# Patient Record
Sex: MALE | Race: WHITE | Employment: UNEMPLOYED | URBAN - METROPOLITAN AREA
[De-identification: names, ages, dates, MRNs, and addresses within clinical notes are randomized per-mention and may not be internally consistent; named-entity substitution may affect disease eponyms.]

---

## 2019-01-29 ENCOUNTER — HOSPITAL ENCOUNTER (INPATIENT)
Age: 39
LOS: 10 days | Discharge: HOME | DRG: 885 | End: 2019-02-08
Attending: PSYCHIATRY & NEUROLOGY | Admitting: PSYCHIATRY & NEUROLOGY
Payer: MEDICARE

## 2019-01-29 PROBLEM — F84.0 AUTISM SPECTRUM DISORDER: Chronic | Status: ACTIVE | Noted: 2019-01-29

## 2019-01-29 PROBLEM — F25.9 SCHIZOAFFECTIVE DISORDER (HCC): Status: ACTIVE | Noted: 2019-01-29

## 2019-01-29 PROCEDURE — 99223 1ST HOSP IP/OBS HIGH 75: CPT | Performed by: PSYCHIATRY & NEUROLOGY

## 2019-01-29 PROCEDURE — 6370000000 HC RX 637 (ALT 250 FOR IP): Performed by: PSYCHIATRY & NEUROLOGY

## 2019-01-29 PROCEDURE — 6370000000 HC RX 637 (ALT 250 FOR IP): Performed by: PHYSICIAN ASSISTANT

## 2019-01-29 PROCEDURE — 1240000000 HC EMOTIONAL WELLNESS R&B

## 2019-01-29 RX ORDER — BISACODYL 10 MG
10 SUPPOSITORY, RECTAL RECTAL DAILY PRN
Status: DISCONTINUED | OUTPATIENT
Start: 2019-01-29 | End: 2019-02-08 | Stop reason: HOSPADM

## 2019-01-29 RX ORDER — RISPERIDONE 2 MG/1
2 TABLET, FILM COATED ORAL 2 TIMES DAILY
Status: DISCONTINUED | OUTPATIENT
Start: 2019-01-29 | End: 2019-02-01

## 2019-01-29 RX ORDER — MAGNESIUM HYDROXIDE/ALUMINUM HYDROXICE/SIMETHICONE 120; 1200; 1200 MG/30ML; MG/30ML; MG/30ML
30 SUSPENSION ORAL EVERY 6 HOURS PRN
Status: DISCONTINUED | OUTPATIENT
Start: 2019-01-29 | End: 2019-02-08 | Stop reason: HOSPADM

## 2019-01-29 RX ORDER — OLANZAPINE 10 MG/1
10 INJECTION, POWDER, LYOPHILIZED, FOR SOLUTION INTRAMUSCULAR EVERY 8 HOURS PRN
Status: DISCONTINUED | OUTPATIENT
Start: 2019-01-29 | End: 2019-01-30

## 2019-01-29 RX ORDER — HALOPERIDOL 5 MG/ML
5 INJECTION INTRAMUSCULAR EVERY 6 HOURS PRN
Status: DISCONTINUED | OUTPATIENT
Start: 2019-01-29 | End: 2019-01-30

## 2019-01-29 RX ORDER — IBUPROFEN 400 MG/1
400 TABLET ORAL EVERY 6 HOURS PRN
Status: DISCONTINUED | OUTPATIENT
Start: 2019-01-29 | End: 2019-02-08 | Stop reason: HOSPADM

## 2019-01-29 RX ORDER — OLANZAPINE 5 MG/1
10 TABLET, ORALLY DISINTEGRATING ORAL EVERY 8 HOURS PRN
Status: DISCONTINUED | OUTPATIENT
Start: 2019-01-29 | End: 2019-01-30

## 2019-01-29 RX ORDER — ACETAMINOPHEN 325 MG/1
650 TABLET ORAL EVERY 4 HOURS PRN
Status: DISCONTINUED | OUTPATIENT
Start: 2019-01-29 | End: 2019-02-08 | Stop reason: HOSPADM

## 2019-01-29 RX ORDER — NICOTINE 21 MG/24HR
1 PATCH, TRANSDERMAL 24 HOURS TRANSDERMAL DAILY
Status: DISCONTINUED | OUTPATIENT
Start: 2019-01-29 | End: 2019-02-08 | Stop reason: HOSPADM

## 2019-01-29 RX ORDER — TRAZODONE HYDROCHLORIDE 50 MG/1
50 TABLET ORAL NIGHTLY PRN
Status: DISCONTINUED | OUTPATIENT
Start: 2019-01-29 | End: 2019-02-08 | Stop reason: HOSPADM

## 2019-01-29 RX ORDER — HYDROXYZINE PAMOATE 50 MG/1
50 CAPSULE ORAL 3 TIMES DAILY PRN
Status: DISCONTINUED | OUTPATIENT
Start: 2019-01-29 | End: 2019-02-08 | Stop reason: HOSPADM

## 2019-01-29 RX ADMIN — RISPERIDONE 2 MG: 2 TABLET ORAL at 08:54

## 2019-01-29 RX ADMIN — METFORMIN HYDROCHLORIDE 500 MG: 500 TABLET ORAL at 17:11

## 2019-01-29 RX ADMIN — RISPERIDONE 2 MG: 2 TABLET ORAL at 22:38

## 2019-01-29 ASSESSMENT — SLEEP AND FATIGUE QUESTIONNAIRES
AVERAGE NUMBER OF SLEEP HOURS: 0
DIFFICULTY FALLING ASLEEP: YES
DIFFICULTY STAYING ASLEEP: YES
SLEEP PATTERN: INSOMNIA
DIFFICULTY ARISING: YES
DO YOU USE A SLEEP AID: NO
RESTFUL SLEEP: NO
DIFFICULTY ARISING: YES
DO YOU USE A SLEEP AID: YES
DO YOU HAVE DIFFICULTY SLEEPING: YES
DIFFICULTY FALLING ASLEEP: YES
AVERAGE NUMBER OF SLEEP HOURS: 2
DO YOU HAVE DIFFICULTY SLEEPING: YES
AVERAGE NUMBER OF SLEEP HOURS: 4
SLEEP PATTERN: DIFFICULTY FALLING ASLEEP;DIFFICULTY ARISING;INSOMNIA
RESTFUL SLEEP: NO
DIFFICULTY STAYING ASLEEP: YES

## 2019-01-29 ASSESSMENT — PATIENT HEALTH QUESTIONNAIRE - PHQ9: SUM OF ALL RESPONSES TO PHQ QUESTIONS 1-9: 17

## 2019-01-29 ASSESSMENT — LIFESTYLE VARIABLES
HISTORY_ALCOHOL_USE: YES
HISTORY_ALCOHOL_USE: YES

## 2019-01-30 LAB
GLUCOSE BLD-MCNC: 111 MG/DL (ref 70–99)
PERFORMED ON: ABNORMAL

## 2019-01-30 PROCEDURE — 99222 1ST HOSP IP/OBS MODERATE 55: CPT | Performed by: PHYSICIAN ASSISTANT

## 2019-01-30 PROCEDURE — 1240000000 HC EMOTIONAL WELLNESS R&B

## 2019-01-30 PROCEDURE — 6370000000 HC RX 637 (ALT 250 FOR IP): Performed by: PSYCHIATRY & NEUROLOGY

## 2019-01-30 PROCEDURE — 99233 SBSQ HOSP IP/OBS HIGH 50: CPT | Performed by: PSYCHIATRY & NEUROLOGY

## 2019-01-30 PROCEDURE — 6370000000 HC RX 637 (ALT 250 FOR IP): Performed by: PHYSICIAN ASSISTANT

## 2019-01-30 RX ORDER — HALOPERIDOL 5 MG/ML
10 INJECTION INTRAMUSCULAR EVERY 6 HOURS PRN
Status: DISCONTINUED | OUTPATIENT
Start: 2019-01-30 | End: 2019-02-08 | Stop reason: HOSPADM

## 2019-01-30 RX ORDER — HALOPERIDOL 5 MG
10 TABLET ORAL EVERY 6 HOURS PRN
Status: DISCONTINUED | OUTPATIENT
Start: 2019-01-30 | End: 2019-02-08 | Stop reason: HOSPADM

## 2019-01-30 RX ADMIN — METFORMIN HYDROCHLORIDE 500 MG: 500 TABLET ORAL at 17:27

## 2019-01-30 RX ADMIN — RISPERIDONE 2 MG: 2 TABLET ORAL at 21:05

## 2019-01-30 RX ADMIN — METFORMIN HYDROCHLORIDE 500 MG: 500 TABLET ORAL at 09:02

## 2019-01-30 RX ADMIN — RISPERIDONE 2 MG: 2 TABLET ORAL at 09:02

## 2019-01-31 LAB
GLUCOSE BLD-MCNC: 120 MG/DL (ref 70–99)
PERFORMED ON: ABNORMAL

## 2019-01-31 PROCEDURE — 99233 SBSQ HOSP IP/OBS HIGH 50: CPT | Performed by: PSYCHIATRY & NEUROLOGY

## 2019-01-31 PROCEDURE — 6370000000 HC RX 637 (ALT 250 FOR IP): Performed by: PSYCHIATRY & NEUROLOGY

## 2019-01-31 PROCEDURE — 1240000000 HC EMOTIONAL WELLNESS R&B

## 2019-01-31 PROCEDURE — 6370000000 HC RX 637 (ALT 250 FOR IP): Performed by: PHYSICIAN ASSISTANT

## 2019-01-31 PROCEDURE — 6360000002 HC RX W HCPCS: Performed by: PSYCHIATRY & NEUROLOGY

## 2019-01-31 RX ADMIN — METFORMIN HYDROCHLORIDE 500 MG: 500 TABLET ORAL at 08:36

## 2019-01-31 RX ADMIN — RISPERIDONE 2 MG: 2 TABLET ORAL at 08:36

## 2019-01-31 RX ADMIN — HALOPERIDOL LACTATE 10 MG: 5 INJECTION, SOLUTION INTRAMUSCULAR at 15:02

## 2019-02-01 LAB
GLUCOSE BLD-MCNC: 100 MG/DL (ref 70–99)
PERFORMED ON: ABNORMAL

## 2019-02-01 PROCEDURE — 1240000000 HC EMOTIONAL WELLNESS R&B

## 2019-02-01 PROCEDURE — 6370000000 HC RX 637 (ALT 250 FOR IP): Performed by: PSYCHIATRY & NEUROLOGY

## 2019-02-01 PROCEDURE — 99233 SBSQ HOSP IP/OBS HIGH 50: CPT | Performed by: PSYCHIATRY & NEUROLOGY

## 2019-02-01 PROCEDURE — 6370000000 HC RX 637 (ALT 250 FOR IP): Performed by: PHYSICIAN ASSISTANT

## 2019-02-01 RX ADMIN — METFORMIN HYDROCHLORIDE 500 MG: 500 TABLET ORAL at 18:01

## 2019-02-01 RX ADMIN — RISPERIDONE 3 MG: 1 TABLET, FILM COATED ORAL at 19:54

## 2019-02-01 RX ADMIN — METFORMIN HYDROCHLORIDE 500 MG: 500 TABLET ORAL at 08:43

## 2019-02-01 RX ADMIN — RISPERIDONE 2 MG: 2 TABLET ORAL at 08:43

## 2019-02-01 RX ADMIN — HYDROXYZINE PAMOATE 50 MG: 50 CAPSULE ORAL at 21:38

## 2019-02-01 RX ADMIN — TRAZODONE HYDROCHLORIDE 50 MG: 50 TABLET ORAL at 21:38

## 2019-02-02 LAB
GLUCOSE BLD-MCNC: 157 MG/DL (ref 70–99)
PERFORMED ON: ABNORMAL

## 2019-02-02 PROCEDURE — 6370000000 HC RX 637 (ALT 250 FOR IP): Performed by: PHYSICIAN ASSISTANT

## 2019-02-02 PROCEDURE — 6370000000 HC RX 637 (ALT 250 FOR IP): Performed by: PSYCHIATRY & NEUROLOGY

## 2019-02-02 PROCEDURE — 99233 SBSQ HOSP IP/OBS HIGH 50: CPT | Performed by: PSYCHIATRY & NEUROLOGY

## 2019-02-02 PROCEDURE — 1240000000 HC EMOTIONAL WELLNESS R&B

## 2019-02-02 RX ORDER — RISPERIDONE 2 MG/1
4 TABLET, FILM COATED ORAL 2 TIMES DAILY
Status: DISCONTINUED | OUTPATIENT
Start: 2019-02-02 | End: 2019-02-08 | Stop reason: HOSPADM

## 2019-02-02 RX ADMIN — RISPERIDONE 3 MG: 1 TABLET, FILM COATED ORAL at 08:57

## 2019-02-02 RX ADMIN — RISPERIDONE 4 MG: 2 TABLET ORAL at 21:10

## 2019-02-02 RX ADMIN — METFORMIN HYDROCHLORIDE 500 MG: 500 TABLET ORAL at 16:41

## 2019-02-02 RX ADMIN — METFORMIN HYDROCHLORIDE 500 MG: 500 TABLET ORAL at 08:57

## 2019-02-03 LAB
GLUCOSE BLD-MCNC: 156 MG/DL (ref 70–99)
PERFORMED ON: ABNORMAL

## 2019-02-03 PROCEDURE — 1240000000 HC EMOTIONAL WELLNESS R&B

## 2019-02-03 PROCEDURE — 6370000000 HC RX 637 (ALT 250 FOR IP): Performed by: PSYCHIATRY & NEUROLOGY

## 2019-02-03 PROCEDURE — 6370000000 HC RX 637 (ALT 250 FOR IP): Performed by: PHYSICIAN ASSISTANT

## 2019-02-03 RX ADMIN — RISPERIDONE 4 MG: 2 TABLET ORAL at 08:08

## 2019-02-03 RX ADMIN — HALOPERIDOL 10 MG: 5 TABLET ORAL at 22:21

## 2019-02-03 RX ADMIN — METFORMIN HYDROCHLORIDE 500 MG: 500 TABLET ORAL at 08:08

## 2019-02-04 LAB
GLUCOSE BLD-MCNC: 174 MG/DL (ref 70–99)
PERFORMED ON: ABNORMAL

## 2019-02-04 PROCEDURE — 1240000000 HC EMOTIONAL WELLNESS R&B

## 2019-02-04 PROCEDURE — 99233 SBSQ HOSP IP/OBS HIGH 50: CPT | Performed by: PSYCHIATRY & NEUROLOGY

## 2019-02-04 PROCEDURE — 6370000000 HC RX 637 (ALT 250 FOR IP): Performed by: PSYCHIATRY & NEUROLOGY

## 2019-02-04 PROCEDURE — 6370000000 HC RX 637 (ALT 250 FOR IP): Performed by: PHYSICIAN ASSISTANT

## 2019-02-04 RX ADMIN — RISPERIDONE 4 MG: 2 TABLET ORAL at 21:01

## 2019-02-04 RX ADMIN — METFORMIN HYDROCHLORIDE 500 MG: 500 TABLET ORAL at 17:05

## 2019-02-04 RX ADMIN — RISPERIDONE 4 MG: 2 TABLET ORAL at 09:43

## 2019-02-04 RX ADMIN — METFORMIN HYDROCHLORIDE 500 MG: 500 TABLET ORAL at 09:42

## 2019-02-04 ASSESSMENT — ENCOUNTER SYMPTOMS
CONSTIPATION: 0
BACK PAIN: 0
DIARRHEA: 0
NAUSEA: 0
SHORTNESS OF BREATH: 0
SORE THROAT: 0
ABDOMINAL PAIN: 0
CHEST TIGHTNESS: 0

## 2019-02-05 LAB
GLUCOSE BLD-MCNC: 189 MG/DL (ref 70–99)
PERFORMED ON: ABNORMAL

## 2019-02-05 PROCEDURE — 1240000000 HC EMOTIONAL WELLNESS R&B

## 2019-02-05 PROCEDURE — 6370000000 HC RX 637 (ALT 250 FOR IP): Performed by: PHYSICIAN ASSISTANT

## 2019-02-05 PROCEDURE — 6370000000 HC RX 637 (ALT 250 FOR IP): Performed by: PSYCHIATRY & NEUROLOGY

## 2019-02-05 PROCEDURE — 99233 SBSQ HOSP IP/OBS HIGH 50: CPT | Performed by: PSYCHIATRY & NEUROLOGY

## 2019-02-05 RX ORDER — HALOPERIDOL 5 MG
5 TABLET ORAL DAILY
Status: DISCONTINUED | OUTPATIENT
Start: 2019-02-05 | End: 2019-02-07

## 2019-02-05 RX ADMIN — RISPERIDONE 4 MG: 2 TABLET ORAL at 08:33

## 2019-02-05 RX ADMIN — HALOPERIDOL 5 MG: 5 TABLET ORAL at 10:42

## 2019-02-05 RX ADMIN — METFORMIN HYDROCHLORIDE 500 MG: 500 TABLET ORAL at 17:07

## 2019-02-05 RX ADMIN — METFORMIN HYDROCHLORIDE 500 MG: 500 TABLET ORAL at 08:33

## 2019-02-05 RX ADMIN — RISPERIDONE 4 MG: 2 TABLET ORAL at 20:50

## 2019-02-06 LAB
GLUCOSE BLD-MCNC: 140 MG/DL (ref 70–99)
PERFORMED ON: ABNORMAL

## 2019-02-06 PROCEDURE — 6370000000 HC RX 637 (ALT 250 FOR IP): Performed by: PHYSICIAN ASSISTANT

## 2019-02-06 PROCEDURE — 6370000000 HC RX 637 (ALT 250 FOR IP): Performed by: PSYCHIATRY & NEUROLOGY

## 2019-02-06 PROCEDURE — 1240000000 HC EMOTIONAL WELLNESS R&B

## 2019-02-06 PROCEDURE — 99233 SBSQ HOSP IP/OBS HIGH 50: CPT | Performed by: PSYCHIATRY & NEUROLOGY

## 2019-02-06 RX ADMIN — RISPERIDONE 4 MG: 2 TABLET ORAL at 20:20

## 2019-02-06 RX ADMIN — RISPERIDONE 4 MG: 2 TABLET ORAL at 08:44

## 2019-02-06 RX ADMIN — METFORMIN HYDROCHLORIDE 500 MG: 500 TABLET ORAL at 08:44

## 2019-02-06 RX ADMIN — METFORMIN HYDROCHLORIDE 500 MG: 500 TABLET ORAL at 16:55

## 2019-02-06 RX ADMIN — HALOPERIDOL 5 MG: 5 TABLET ORAL at 08:44

## 2019-02-07 LAB
GLUCOSE BLD-MCNC: 164 MG/DL (ref 70–99)
PERFORMED ON: ABNORMAL

## 2019-02-07 PROCEDURE — 6370000000 HC RX 637 (ALT 250 FOR IP): Performed by: PHYSICIAN ASSISTANT

## 2019-02-07 PROCEDURE — 6370000000 HC RX 637 (ALT 250 FOR IP): Performed by: PSYCHIATRY & NEUROLOGY

## 2019-02-07 PROCEDURE — 99233 SBSQ HOSP IP/OBS HIGH 50: CPT | Performed by: PSYCHIATRY & NEUROLOGY

## 2019-02-07 PROCEDURE — 1240000000 HC EMOTIONAL WELLNESS R&B

## 2019-02-07 PROCEDURE — 6360000002 HC RX W HCPCS: Performed by: STUDENT IN AN ORGANIZED HEALTH CARE EDUCATION/TRAINING PROGRAM

## 2019-02-07 RX ADMIN — RISPERIDONE 4 MG: 2 TABLET ORAL at 20:36

## 2019-02-07 RX ADMIN — METFORMIN HYDROCHLORIDE 500 MG: 500 TABLET ORAL at 08:36

## 2019-02-07 RX ADMIN — RISPERIDONE 4 MG: 2 TABLET ORAL at 08:23

## 2019-02-07 RX ADMIN — RISPERIDONE 25 MG: KIT at 15:33

## 2019-02-07 RX ADMIN — METFORMIN HYDROCHLORIDE 500 MG: 500 TABLET ORAL at 16:51

## 2019-02-07 RX ADMIN — HALOPERIDOL 5 MG: 5 TABLET ORAL at 08:23

## 2019-02-08 VITALS
WEIGHT: 219 LBS | RESPIRATION RATE: 16 BRPM | HEART RATE: 101 BPM | DIASTOLIC BLOOD PRESSURE: 71 MMHG | HEIGHT: 68 IN | BODY MASS INDEX: 33.19 KG/M2 | SYSTOLIC BLOOD PRESSURE: 132 MMHG | TEMPERATURE: 97.7 F

## 2019-02-08 LAB
GLUCOSE BLD-MCNC: 188 MG/DL (ref 70–99)
PERFORMED ON: ABNORMAL

## 2019-02-08 PROCEDURE — 5130000000 HC BRIDGE APPOINTMENT

## 2019-02-08 PROCEDURE — 6370000000 HC RX 637 (ALT 250 FOR IP): Performed by: PSYCHIATRY & NEUROLOGY

## 2019-02-08 PROCEDURE — 99239 HOSP IP/OBS DSCHRG MGMT >30: CPT | Performed by: PSYCHIATRY & NEUROLOGY

## 2019-02-08 PROCEDURE — 6370000000 HC RX 637 (ALT 250 FOR IP): Performed by: PHYSICIAN ASSISTANT

## 2019-02-08 RX ORDER — RISPERIDONE 4 MG/1
4 TABLET, FILM COATED ORAL 2 TIMES DAILY
Qty: 60 TABLET | Refills: 0 | Status: ON HOLD | OUTPATIENT
Start: 2019-02-08 | End: 2019-05-08

## 2019-02-08 RX ADMIN — RISPERIDONE 4 MG: 2 TABLET ORAL at 08:40

## 2019-02-08 RX ADMIN — METFORMIN HYDROCHLORIDE 500 MG: 500 TABLET ORAL at 08:40

## 2019-05-08 ENCOUNTER — APPOINTMENT (OUTPATIENT)
Dept: CT IMAGING | Age: 39
End: 2019-05-08
Payer: MEDICARE

## 2019-05-08 ENCOUNTER — HOSPITAL ENCOUNTER (INPATIENT)
Age: 39
LOS: 20 days | Discharge: HOME | DRG: 885 | End: 2019-05-28
Attending: PSYCHIATRY & NEUROLOGY | Admitting: PSYCHIATRY & NEUROLOGY
Payer: MEDICARE

## 2019-05-08 ENCOUNTER — HOSPITAL ENCOUNTER (EMERGENCY)
Age: 39
Discharge: PSYCHIATRIC OR SUBSTANCE USE FACILITY | End: 2019-05-08
Attending: EMERGENCY MEDICINE
Payer: MEDICARE

## 2019-05-08 VITALS
HEART RATE: 61 BPM | SYSTOLIC BLOOD PRESSURE: 103 MMHG | WEIGHT: 215.39 LBS | BODY MASS INDEX: 32.75 KG/M2 | TEMPERATURE: 98.6 F | OXYGEN SATURATION: 92 % | DIASTOLIC BLOOD PRESSURE: 23 MMHG | RESPIRATION RATE: 10 BRPM

## 2019-05-08 DIAGNOSIS — F25.9 SCHIZOAFFECTIVE DISORDER, UNSPECIFIED TYPE (HCC): Primary | ICD-10-CM

## 2019-05-08 PROBLEM — F20.9 SCHIZOPHRENIA (HCC): Status: ACTIVE | Noted: 2019-05-08

## 2019-05-08 LAB
A/G RATIO: 1.3 (ref 1.1–2.2)
ACETAMINOPHEN LEVEL: <5 UG/ML (ref 10–30)
ALBUMIN SERPL-MCNC: 4.1 G/DL (ref 3.4–5)
ALP BLD-CCNC: 81 U/L (ref 40–129)
ALT SERPL-CCNC: 31 U/L (ref 10–40)
AMPHETAMINE SCREEN, URINE: NORMAL
ANION GAP SERPL CALCULATED.3IONS-SCNC: 9 MMOL/L (ref 3–16)
AST SERPL-CCNC: 18 U/L (ref 15–37)
BARBITURATE SCREEN URINE: NORMAL
BASOPHILS ABSOLUTE: 0 K/UL (ref 0–0.2)
BASOPHILS RELATIVE PERCENT: 0.4 %
BENZODIAZEPINE SCREEN, URINE: NORMAL
BILIRUB SERPL-MCNC: 0.3 MG/DL (ref 0–1)
BILIRUBIN URINE: ABNORMAL
BLOOD, URINE: NEGATIVE
BUN BLDV-MCNC: 13 MG/DL (ref 7–20)
CALCIUM SERPL-MCNC: 8.7 MG/DL (ref 8.3–10.6)
CANNABINOID SCREEN URINE: NORMAL
CHLORIDE BLD-SCNC: 108 MMOL/L (ref 99–110)
CLARITY: ABNORMAL
CO2: 25 MMOL/L (ref 21–32)
COCAINE METABOLITE SCREEN URINE: NORMAL
COLOR: ABNORMAL
CREAT SERPL-MCNC: 0.9 MG/DL (ref 0.9–1.3)
EOSINOPHILS ABSOLUTE: 0.2 K/UL (ref 0–0.6)
EOSINOPHILS RELATIVE PERCENT: 2.7 %
EPITHELIAL CELLS, UA: 1 /HPF (ref 0–5)
ETHANOL: NORMAL MG/DL (ref 0–0.08)
GFR AFRICAN AMERICAN: >60
GFR NON-AFRICAN AMERICAN: >60
GLOBULIN: 3.2 G/DL
GLUCOSE BLD-MCNC: 110 MG/DL (ref 70–99)
GLUCOSE BLD-MCNC: 119 MG/DL (ref 70–99)
GLUCOSE BLD-MCNC: 131 MG/DL (ref 70–99)
GLUCOSE URINE: NEGATIVE MG/DL
HCT VFR BLD CALC: 41.2 % (ref 40.5–52.5)
HEMOGLOBIN: 14.4 G/DL (ref 13.5–17.5)
HYALINE CASTS: 3 /LPF (ref 0–8)
KETONES, URINE: ABNORMAL MG/DL
LEUKOCYTE ESTERASE, URINE: NEGATIVE
LYMPHOCYTES ABSOLUTE: 1.4 K/UL (ref 1–5.1)
LYMPHOCYTES RELATIVE PERCENT: 18.9 %
Lab: NORMAL
MCH RBC QN AUTO: 31.1 PG (ref 26–34)
MCHC RBC AUTO-ENTMCNC: 35 G/DL (ref 31–36)
MCV RBC AUTO: 88.8 FL (ref 80–100)
METHADONE SCREEN, URINE: NORMAL
MICROSCOPIC EXAMINATION: YES
MONOCYTES ABSOLUTE: 0.6 K/UL (ref 0–1.3)
MONOCYTES RELATIVE PERCENT: 7.5 %
NEUTROPHILS ABSOLUTE: 5.2 K/UL (ref 1.7–7.7)
NEUTROPHILS RELATIVE PERCENT: 70.5 %
NITRITE, URINE: NEGATIVE
OPIATE SCREEN URINE: NORMAL
OXYCODONE URINE: NORMAL
PDW BLD-RTO: 14.1 % (ref 12.4–15.4)
PERFORMED ON: ABNORMAL
PERFORMED ON: ABNORMAL
PH UA: 5.5
PH UA: 5.5 (ref 5–8)
PHENCYCLIDINE SCREEN URINE: NORMAL
PLATELET # BLD: 251 K/UL (ref 135–450)
PMV BLD AUTO: 7.9 FL (ref 5–10.5)
POTASSIUM SERPL-SCNC: 3.6 MMOL/L (ref 3.5–5.1)
PROPOXYPHENE SCREEN: NORMAL
PROTEIN UA: ABNORMAL MG/DL
RBC # BLD: 4.64 M/UL (ref 4.2–5.9)
RBC UA: 4 /HPF (ref 0–4)
SALICYLATE, SERUM: <0.3 MG/DL (ref 15–30)
SODIUM BLD-SCNC: 142 MMOL/L (ref 136–145)
SPECIFIC GRAVITY UA: >1.03 (ref 1–1.03)
TOTAL PROTEIN: 7.3 G/DL (ref 6.4–8.2)
URINE REFLEX TO CULTURE: ABNORMAL
URINE TYPE: ABNORMAL
UROBILINOGEN, URINE: 0.2 E.U./DL
WBC # BLD: 7.3 K/UL (ref 4–11)
WBC UA: 2 /HPF (ref 0–5)

## 2019-05-08 PROCEDURE — 80307 DRUG TEST PRSMV CHEM ANLYZR: CPT

## 2019-05-08 PROCEDURE — 6370000000 HC RX 637 (ALT 250 FOR IP): Performed by: PSYCHIATRY & NEUROLOGY

## 2019-05-08 PROCEDURE — 81001 URINALYSIS AUTO W/SCOPE: CPT

## 2019-05-08 PROCEDURE — G0480 DRUG TEST DEF 1-7 CLASSES: HCPCS

## 2019-05-08 PROCEDURE — 96372 THER/PROPH/DIAG INJ SC/IM: CPT

## 2019-05-08 PROCEDURE — 6360000002 HC RX W HCPCS: Performed by: EMERGENCY MEDICINE

## 2019-05-08 PROCEDURE — 85025 COMPLETE CBC W/AUTO DIFF WBC: CPT

## 2019-05-08 PROCEDURE — 70450 CT HEAD/BRAIN W/O DYE: CPT

## 2019-05-08 PROCEDURE — 1240000000 HC EMOTIONAL WELLNESS R&B

## 2019-05-08 PROCEDURE — 2580000003 HC RX 258: Performed by: EMERGENCY MEDICINE

## 2019-05-08 PROCEDURE — 80053 COMPREHEN METABOLIC PANEL: CPT

## 2019-05-08 PROCEDURE — 99285 EMERGENCY DEPT VISIT HI MDM: CPT

## 2019-05-08 RX ORDER — TRAZODONE HYDROCHLORIDE 50 MG/1
50 TABLET ORAL NIGHTLY PRN
Status: DISCONTINUED | OUTPATIENT
Start: 2019-05-08 | End: 2019-05-28 | Stop reason: HOSPADM

## 2019-05-08 RX ORDER — 0.9 % SODIUM CHLORIDE 0.9 %
500 INTRAVENOUS SOLUTION INTRAVENOUS ONCE
Status: COMPLETED | OUTPATIENT
Start: 2019-05-08 | End: 2019-05-08

## 2019-05-08 RX ORDER — HYDROXYZINE PAMOATE 50 MG/1
50 CAPSULE ORAL 3 TIMES DAILY PRN
Status: DISCONTINUED | OUTPATIENT
Start: 2019-05-08 | End: 2019-05-28 | Stop reason: HOSPADM

## 2019-05-08 RX ORDER — DIPHENHYDRAMINE HYDROCHLORIDE 50 MG/ML
12.5 INJECTION INTRAMUSCULAR; INTRAVENOUS ONCE
Status: COMPLETED | OUTPATIENT
Start: 2019-05-08 | End: 2019-05-08

## 2019-05-08 RX ORDER — LORAZEPAM 2 MG/ML
1 INJECTION INTRAMUSCULAR ONCE
Status: COMPLETED | OUTPATIENT
Start: 2019-05-08 | End: 2019-05-08

## 2019-05-08 RX ORDER — MAGNESIUM HYDROXIDE/ALUMINUM HYDROXICE/SIMETHICONE 120; 1200; 1200 MG/30ML; MG/30ML; MG/30ML
30 SUSPENSION ORAL EVERY 6 HOURS PRN
Status: DISCONTINUED | OUTPATIENT
Start: 2019-05-08 | End: 2019-05-28 | Stop reason: HOSPADM

## 2019-05-08 RX ORDER — RISPERIDONE 2 MG/1
1 TABLET, FILM COATED ORAL NIGHTLY
Status: ON HOLD | COMMUNITY
Start: 2019-03-18 | End: 2019-05-28 | Stop reason: HOSPADM

## 2019-05-08 RX ORDER — RISPERIDONE 2 MG/1
2 TABLET, FILM COATED ORAL NIGHTLY
Status: DISCONTINUED | OUTPATIENT
Start: 2019-05-08 | End: 2019-05-09

## 2019-05-08 RX ORDER — HALOPERIDOL 5 MG/ML
5 INJECTION INTRAMUSCULAR EVERY 6 HOURS PRN
Status: DISCONTINUED | OUTPATIENT
Start: 2019-05-08 | End: 2019-05-09

## 2019-05-08 RX ORDER — NICOTINE POLACRILEX 4 MG
15 LOZENGE BUCCAL PRN
Status: DISCONTINUED | OUTPATIENT
Start: 2019-05-08 | End: 2019-05-28 | Stop reason: HOSPADM

## 2019-05-08 RX ORDER — DEXTROSE MONOHYDRATE 25 G/50ML
12.5 INJECTION, SOLUTION INTRAVENOUS PRN
Status: DISCONTINUED | OUTPATIENT
Start: 2019-05-08 | End: 2019-05-28 | Stop reason: HOSPADM

## 2019-05-08 RX ORDER — DEXTROSE MONOHYDRATE 50 MG/ML
100 INJECTION, SOLUTION INTRAVENOUS PRN
Status: DISCONTINUED | OUTPATIENT
Start: 2019-05-08 | End: 2019-05-28 | Stop reason: HOSPADM

## 2019-05-08 RX ORDER — TRAZODONE HYDROCHLORIDE 50 MG/1
50 TABLET ORAL NIGHTLY PRN
Status: DISCONTINUED | OUTPATIENT
Start: 2019-05-09 | End: 2019-05-08

## 2019-05-08 RX ORDER — ACETAMINOPHEN 325 MG/1
650 TABLET ORAL EVERY 4 HOURS PRN
Status: DISCONTINUED | OUTPATIENT
Start: 2019-05-08 | End: 2019-05-28 | Stop reason: HOSPADM

## 2019-05-08 RX ORDER — IBUPROFEN 400 MG/1
400 TABLET ORAL EVERY 6 HOURS PRN
Status: DISCONTINUED | OUTPATIENT
Start: 2019-05-08 | End: 2019-05-28 | Stop reason: HOSPADM

## 2019-05-08 RX ADMIN — SODIUM CHLORIDE 500 ML: 9 INJECTION, SOLUTION INTRAVENOUS at 09:47

## 2019-05-08 RX ADMIN — LORAZEPAM 1 MG: 2 INJECTION INTRAMUSCULAR; INTRAVENOUS at 14:06

## 2019-05-08 RX ADMIN — DIPHENHYDRAMINE HYDROCHLORIDE 12.5 MG: 50 INJECTION, SOLUTION INTRAMUSCULAR; INTRAVENOUS at 14:05

## 2019-05-08 RX ADMIN — RISPERIDONE 2 MG: 2 TABLET ORAL at 21:35

## 2019-05-08 ASSESSMENT — LIFESTYLE VARIABLES: HISTORY_ALCOHOL_USE: NO

## 2019-05-08 ASSESSMENT — SLEEP AND FATIGUE QUESTIONNAIRES
AVERAGE NUMBER OF SLEEP HOURS: 5
DO YOU USE A SLEEP AID: NO
DO YOU HAVE DIFFICULTY SLEEPING: NO

## 2019-05-08 NOTE — ED PROVIDER NOTES
Triage Chief Complaint:   Altered Mental Status (per squad pt states he takes medication that turns him into a mythical creature. pt not talking on arrival although walked in from squad pt has hx of mental disorder )    ADARSH Baugh is a 44 y.o. male that presents for evaluation of altered mental status. The patient has past medical history schizophrenia, hypertension, diabetes. He was found wandering around his house stating that he was locked out. Per report he also told EMS that he turns into [place] mythical creature\". He arrives able to state that his name is Xochitl Weaver but otherwise will not participate in ROS/HPI and continues to stare blankly. ROS:  At least 12 systems reviewed and otherwise acutely negative except as in the [address]     Past Medical History:   Diagnosis Date    Hypertension     Schizophrenia, schizo-affective ([facility] [address]      Past Surgical History:   Procedure Laterality Date    NOSE SURGERY      TONSILLECTOMY       Family History   Problem Relation Age of Onset    Heart Disease Paternal Aunt     Cancer Paternal Aunt     Heart Disease Paternal Grandfather     Cancer Paternal Grandfather     Substance Abuse Mother      Social History     Socioeconomic History    Marital status: Single     Spouse name: Not on file    Number of children: 1    Years of education: Not on file    Highest education level: Not on file   Occupational History    Not on file   Social Needs    Financial resource strain: Not on file    Food insecurity:     Worry: Not on file     Inability: Not on file    Transportation needs:     Medical: Not on file     Non-medical: Not on file   Tobacco Use    Smoking status: Current Every Day Smoker     Packs/day: 2.00     Years: 23.00     Pack years: 46.00     Types: Cigarettes    Smokeless tobacco: Never Used   Substance and Sexual Activity    Alcohol use: Yes     Comment: 3x wk    Drug use: Not on file    Sexual activity: Yes     Partners: Female   Lifestyle    Physical activity:     Days per week: Not on file     Minutes per session: Not on file    Stress: Not on file   Relationships    Social connections:     Talks on phone: Not on file     Gets together: Not on file     Attends Confucianism service: Not on file     Active member of club or organization: Not on file     Attends meetings of clubs or organizations: Not on file     Relationship status: Not on file    Intimate partner violence:     Fear of current or ex partner: Not on file     Emotionally abused: Not on file     Physically abused: Not on file     Forced sexual activity: Not on file   Other Topics Concern    Not on file   Social History Narrative    Not on file     Current Facility-Administered Medications   Medication Dose Route Frequency Provider Last Rate Last Dose    0.9 % sodium chloride bolus  500 mL Intravenous Once Lora Michelle MD 1,000 mL/hr at 05/08/19 0947 500 mL at 05/08/19 0947     Current Outpatient Medications   Medication Sig Dispense Refill    metFORMIN (GLUCOPHAGE) 500 MG tablet Take 1 tablet by mouth 2 times daily (with meals) 60 tablet 0    risperiDONE (RISPERDAL) 4 MG tablet Take 1 tablet by mouth 2 times daily 60 tablet 0    risperiDONE microspheres (RISPERDAL CONSTA) 25 MG injection Inject 2 mLs into the muscle every 14 days 2 vial 4     Allergies   Allergen Reactions    Lisinopril        [unfilled]    Nursing Notes Reviewed    Physical Exam:  Vitals:    05/08/19 0910   BP: (!) 138/92   Pulse: 80   Resp: 22   Temp: 98.6 °F (37 °C)   SpO2: 94%       GENERAL APPEARANCE: Awake and alert. Cooperative. No acute distress. HEAD: Normocephalic. Atraumatic. EYES: EOM's grossly intact. Sclera anicteric. ENT: Mucous membranes are moist. Tolerates saliva. No trismus. NECK: Supple. No meningismus. Trachea midline. HEART: RRR. Radial pulses 2+. LUNGS: Respirations unlabored. CTAB  ABDOMEN: Soft. Non-tender. No guarding or rebound. EXTREMITIES: No acute deformities.   SKIN: Warm and dry. NEUROLOGICAL: No gross facial drooping. Moves all 4 extremities spontaneously. PSYCHIATRIC: The patient has a very bizarre affect. He may be responding to internal stimuli as he is not able to successfully hold a conversation and continues to stare straight off into space. He denies suicidal or homicidal ideation but he will not answer questions regarding hallucinations. I have reviewed and interpreted all of the currently available lab results from this visit (if applicable):  Results for orders placed or performed during the hospital encounter of 05/08/19   CBC Auto Differential   Result Value Ref Range    WBC 7.3 4.0 - 11.0 K/uL    RBC 4.64 4.20 - 5.90 M/uL    Hemoglobin 14.4 13.5 - 17.5 g/dL    Hematocrit 41.2 40.5 - 52.5 %    MCV 88.8 80.0 - 100.0 fL    MCH 31.1 26.0 - 34.0 pg    MCHC 35.0 31.0 - 36.0 g/dL    RDW 14.1 12.4 - 15.4 %    Platelets 926 407 - 945 K/uL    MPV 7.9 5.0 - 10.5 fL    Neutrophils % 70.5 %    Lymphocytes % 18.9 %    Monocytes % 7.5 %    Eosinophils % 2.7 %    Basophils % 0.4 %    Neutrophils # 5.2 1.7 - 7.7 K/uL    Lymphocytes # 1.4 1.0 - 5.1 K/uL    Monocytes # 0.6 0.0 - 1.3 K/uL    Eosinophils # 0.2 0.0 - 0.6 K/uL    Basophils # 0.0 0.0 - 0.2 K/uL   POCT Glucose   Result Value Ref Range    POC Glucose 119 (H) 70 - 99 mg/dl    Performed on ACCU-CHEK         Radiographs (if obtained):  [] The following radiograph was interpreted by myself in the absence of a radiologist:  [x] Radiologist's Report Reviewed:     CT HEAD WO CONTRAST (Final result)   Result time 05/08/19 09:43:49   Final result by Angel Gonzalez MD (05/08/19 09:43:49)                Impression:    No acute intracranial abnormality.             Narrative:    EXAMINATION:  CT OF THE HEAD WITHOUT CONTRAST  5/8/2019 9:18 am    TECHNIQUE:  CT of the head was performed without the administration of intravenous  contrast. Dose modulation, iterative reconstruction, and/or weight based  adjustment of the mA/kV was utilized to reduce the radiation dose to as low  as reasonably achievable. COMPARISON:  None. HISTORY:  ORDERING SYSTEM PROVIDED HISTORY: AMS  TECHNOLOGIST PROVIDED HISTORY:  Has a \"code stroke\" or \"stroke alert\" been called? ->No  Ordering Physician Provided Reason for Exam: ams  Acuity: Acute  Type of Exam: Initial    FINDINGS:  BRAIN/VENTRICLES: There is no acute intracranial hemorrhage, mass effect or  midline shift.  No abnormal extra-axial fluid collection.  The gray-white  differentiation is maintained without evidence of an acute infarct.  There is  no evidence of hydrocephalus. ORBITS: The visualized portion of the orbits demonstrate no acute abnormality. SINUSES: The visualized paranasal sinuses and mastoid air cells demonstrate  no acute abnormality. SOFT TISSUES/SKULL:  No acute abnormality of the visualized skull or soft  tissues.                      EKG (if obtained): (All EKG's are interpreted by myself in the absence of a cardiologist)  Initial EKG on my interpretation shows n/a    MDM:  Differential diagnosis: Psychiatric issue, intoxication, but glucose abn    CT head wnl  CBC/CMP/Etoh/ASA/Tylenol/UDS/UA no acute abn    At this time with his lab workup and CT of the head unremarkable have consult psychiatry as I'm concerned that his altered mental status is of a psychiatric etiology. The patient is cleared from a medical perspective. Upon re-eval at 12:20pm I asked if he recalls meeting me earlier today and he states \"yes, you are Will Pacheco\". This is not correct and I strongly suspect he is having hallucinations and may be responding to internal stimuli. I will sign a pysch hold. The patient was accepted by Dr. Daquan Reyes to [place]. Of note at approximately 2 PM the patient started to walk around the ED and is becoming increasingly more prone to not following direction.   He was resistant to sitting back in bed so after we coaxed him into sitting back in bed IM 1 mg of Ativan and 12.5 mg Benadryl given. Old records reviewed. Labs and imaging reviewed and results discussed with patient. Patient was given scripts for the following medications. I counseled patient how to take these medications. New Prescriptions    No medications on file         CRITICAL CARE TIME   Total Critical Care time was 0 minutes, excluding separately reportable procedures. There was a high probability of clinically significant/life threatening deterioration in the patient's condition which required my urgent intervention.       Clinical Impression:  AMS, psych issue  (Please note that portions of this note may have been completed with a voice recognition program. Efforts were made to edit the dictations but occasionally words are mis-transcribed.)    MD Edenilson Devine MD  05/08/19 8030

## 2019-05-08 NOTE — ED NOTES
Pt sitting up in bed per md pt allowed to have food and drink pt alert and states he was supposed to go to behavioral clinic today to get ongliza injection. Pt with call light in reach and will keep close supervision pt on monitor.       Boston Chapin RN  05/08/19 0855

## 2019-05-08 NOTE — ED NOTES
Pt report called to nursing staff at [facility] at this time pt report given to first care.       Brooke Galo, IRA  05/08/19 6595

## 2019-05-08 NOTE — ED NOTES
Pt speaking and answering yes and not questions pt asked to provide urine or to have straight cath. Pt stated \" I cant go\"  I told pt only needed a small amount of urine. Pt states \"your going to have to cath me\"  Pt agreeable to have straight cath. Pt straight cathed at this time. Pt yelled out \"motherfucker\" pt tolerated procedure. Pt urine sent to lab at this time.        Vince Radford RN  05/08/19 0079

## 2019-05-08 NOTE — ED NOTES
Pt came walking down the hallway, staring straight ahead, when asked if there was something he needed, pt stated \" we need to save human kind\" pt then preceded to walk down the hallway backwards and turned backwards into his room, pt then came back out again and preceded to the ambulance doors and stated \" I need a location service\"  Pt then was escorted back to his room, ED MD is at bedside, RN charge nurse is at bedside as well      Дмитрий Gan RN  05/08/19 0974

## 2019-05-09 PROCEDURE — 1240000000 HC EMOTIONAL WELLNESS R&B

## 2019-05-09 PROCEDURE — 6370000000 HC RX 637 (ALT 250 FOR IP): Performed by: PSYCHIATRY & NEUROLOGY

## 2019-05-09 PROCEDURE — 99223 1ST HOSP IP/OBS HIGH 75: CPT | Performed by: PSYCHIATRY & NEUROLOGY

## 2019-05-09 PROCEDURE — 6360000002 HC RX W HCPCS: Performed by: PSYCHIATRY & NEUROLOGY

## 2019-05-09 RX ORDER — LORAZEPAM 2 MG/1
2 TABLET ORAL EVERY 6 HOURS PRN
Status: DISCONTINUED | OUTPATIENT
Start: 2019-05-09 | End: 2019-05-28 | Stop reason: HOSPADM

## 2019-05-09 RX ORDER — LORAZEPAM 2 MG/ML
2 INJECTION INTRAMUSCULAR EVERY 6 HOURS PRN
Status: DISCONTINUED | OUTPATIENT
Start: 2019-05-09 | End: 2019-05-28 | Stop reason: HOSPADM

## 2019-05-09 RX ORDER — LORAZEPAM 2 MG/ML
2 INJECTION INTRAMUSCULAR ONCE
Status: COMPLETED | OUTPATIENT
Start: 2019-05-09 | End: 2019-05-09

## 2019-05-09 RX ORDER — HALOPERIDOL 5 MG/ML
10 INJECTION INTRAMUSCULAR EVERY 6 HOURS PRN
Status: DISCONTINUED | OUTPATIENT
Start: 2019-05-09 | End: 2019-05-28 | Stop reason: HOSPADM

## 2019-05-09 RX ORDER — DIPHENHYDRAMINE HYDROCHLORIDE 50 MG/ML
50 INJECTION INTRAMUSCULAR; INTRAVENOUS ONCE
Status: COMPLETED | OUTPATIENT
Start: 2019-05-09 | End: 2019-05-09

## 2019-05-09 RX ORDER — HALOPERIDOL 5 MG/ML
5 INJECTION INTRAMUSCULAR ONCE
Status: COMPLETED | OUTPATIENT
Start: 2019-05-09 | End: 2019-05-09

## 2019-05-09 RX ORDER — DIPHENHYDRAMINE HYDROCHLORIDE 50 MG/ML
50 INJECTION INTRAMUSCULAR; INTRAVENOUS EVERY 6 HOURS PRN
Status: DISCONTINUED | OUTPATIENT
Start: 2019-05-09 | End: 2019-05-28 | Stop reason: HOSPADM

## 2019-05-09 RX ORDER — RISPERIDONE 2 MG/1
2 TABLET, FILM COATED ORAL 2 TIMES DAILY
Status: DISCONTINUED | OUTPATIENT
Start: 2019-05-09 | End: 2019-05-20

## 2019-05-09 RX ORDER — DIPHENHYDRAMINE HCL 25 MG
50 TABLET ORAL EVERY 6 HOURS PRN
Status: DISCONTINUED | OUTPATIENT
Start: 2019-05-09 | End: 2019-05-28 | Stop reason: HOSPADM

## 2019-05-09 RX ORDER — HALOPERIDOL 10 MG/1
10 TABLET ORAL EVERY 6 HOURS PRN
Status: DISCONTINUED | OUTPATIENT
Start: 2019-05-09 | End: 2019-05-28 | Stop reason: HOSPADM

## 2019-05-09 RX ADMIN — LORAZEPAM 2 MG: 2 INJECTION INTRAMUSCULAR; INTRAVENOUS at 11:27

## 2019-05-09 RX ADMIN — HALOPERIDOL LACTATE 5 MG: 5 INJECTION INTRAMUSCULAR at 11:27

## 2019-05-09 RX ADMIN — RISPERIDONE 2 MG: 2 TABLET ORAL at 21:34

## 2019-05-09 RX ADMIN — DIPHENHYDRAMINE HYDROCHLORIDE 50 MG: 50 INJECTION, SOLUTION INTRAMUSCULAR; INTRAVENOUS at 11:27

## 2019-05-09 RX ADMIN — HALOPERIDOL LACTATE 5 MG: 5 INJECTION, SOLUTION INTRAMUSCULAR at 09:55

## 2019-05-09 NOTE — BH NOTE
Therapist tried to complete psycho social assessment on Pt. Pt came into [place] [address] with therapist.  Pt was carrying three new testaments which he used to trace the wall and outlets. Pt took the pillow case off a pillow and started wiping the table stating I am getting rid of bugs, there were no bugs. Pt then laid on the floor and put is legs up in the air. Pt then got up and started touching the wall and then climbed into a chair from the back of the chair. Pt stood up turned up the volume of the radio and started pushing buttons on the thermostat. Pt started turning the lights on and off. Pt tried to get out the door but could not open the door. When therapist asked Pt questions he would answer with a number.

## 2019-05-09 NOTE — PROGRESS NOTES
Occupational Therapy      Attempted OT eval.  Hold t this time per nsg to let patient rest.  Plan to re-attempt as time permits.     Deja Weller, OTR/L  #[phone]

## 2019-05-09 NOTE — BH NOTE
`[facility] [place]  Admission Note     Admission Type:   Admission Type: Involuntary    Reason for admission:  Reason for Admission: Delusions,  A/V/H    PATIENT STRENGTHS:  Strengths: No significant Physical Illness    Patient Strengths and Limitations:  Limitations: External locus of control    Addictive Behavior:   Addictive Behavior  In the past 3 months, have you felt or has someone told you that you have a problem with:  : None  Do you have a history of Chemical Use?: No  Do you have a history of Alcohol Use?: No  Do you have a history of Street Drug Abuse?: No  Histroy of Prescripton Drug Abuse?: No    Medical Problems:   Past Medical History:   Diagnosis Date    Hypertension     Schizophrenia, schizo-affective (HCC)        Status EXAM:  Status and Exam  Normal: No  Facial Expression: Exaggerated, Elevated  Affect: Incongruent, Unstable  Level of Consciousness: Alert  Mood:Normal: No  Mood: Labile, Suspicious  Motor Activity:Normal: No  Motor Activity: Repetitive Acts  Interview Behavior: Evasive(delusional)  Preception: [place] to Person  Attention:Normal: No  Attention: Distractible  Thought Processes: Blocking, Tangential  Thought Content:Normal: No  Thought Content: Paranoia, Delusions  Hallucinations:  Auditory (Comment)(Denies but RTIS)  Delusions: Yes  Delusions: Persecution  Memory:Normal: No  Memory: Poor Recent, Poor Remote  Insight and Judgment: No  Insight and Judgment: Poor Judgment, Poor Insight  Present Suicidal Ideation: No  Present Homicidal Ideation: No    Tobacco Screening:  Practical Counseling, on admission, hernandez X, if applicable and completed (first 3 are required if patient doesn't refuse):            ( )  Recognizing danger situations (included triggers and roadblocks)                    ( )  Coping skills (new ways to manage stress, exercise, relaxation techniques, changing routine, distraction)                                                           (x )  Basic information about quitting (benefits of quitting, techniques in how to quit, available resources  ( ) Referral for counseling faxed to [facility]                          ( x) Patient refused counseling  ( ) Patient has not smoked in the last 30 days    Metabolic Screening:    Lab Results   Component Value Date    LABA1C 5.5 03/14/2011       Lab Results   Component Value Date    CHOL 102 03/14/2011     Lab Results   Component Value Date    TRIG 113 03/14/2011     Lab Results   Component Value Date    HDL 21 (L) 03/14/2011     No components found for: [facility]RE[facility] [place]  Lab Results   Component Value Date    LABVLDL 23 03/14/2011         Body mass index is 32.99 kg/m². BP Readings from Last 2 Encounters:   05/08/19 135/74   05/08/19 (!) 103/23           Pt admitted with followings belongings:  Dentures: None  Vision - Corrective Lenses: None  Hearing Aid: None  Jewelry: None  Body Piercings Removed: N/A  Clothing: Socks, Pants, Shirt  Were All Patient Medications Collected?: Not Applicable  Other Valuables: Cell phone     Valuables placed in locker. Valuables placed in safe in security envelope, number:  none. Patient's home medications were N/A. Patient oriented to surroundings and program expectations and copy of patient rights given. Received admission packet:  yes. Consents reviewed, signed no. Refused no. Patient verbalize understanding:  WENDI, Patient not in right state of mind.   Patient education on precautions: yes                   Anu Wynn RN

## 2019-05-09 NOTE — BH NOTE
[facility]  Initial Interdisciplinary Treatment Plan NOTE    Review Date & Time: 5/9/19 6079    Patient was not in treatment team    Admission Type:   Admission Type: Involuntary    Reason for admission:  Reason for Admission: Delusions,  A/V/H      Estimated Length of Stay Update:  3 to 5 days  Estimated Discharge Date Update: 5/12/19 to 5/15/19    PATIENT STRENGTHS:  Patient Strengths Strengths: (WENDI)  Patient Strengths and Limitations:Limitations: Unrealistic self-view, Multiple barriers to leisure interests, Difficulty problem solving/relies on others to help solve problems  Addictive Behavior:Addictive Behavior  In the past 3 months, have you felt or has someone told you that you have a problem with:  : (WENDI)  Do you have a history of Chemical Use?: (WENDI)  Do you have a history of Alcohol Use?: (WENDI)  Do you have a history of Street Drug Abuse?: (WENDI)  Histroy of Prescripton Drug Abuse?: (WENDI)  Medical Problems:  Past Medical History:   Diagnosis Date    Hypertension     Schizophrenia, schizo-affective ([facility] [address]        EDUCATION:   Learner Progress Toward Treatment Goals: Reviewed goals and plan of care    Method: Individual    Outcome: No evidence of Learning    PATIENT GOALS: Patient unable to make goal right now. PLAN/TREATMENT RECOMMENDATIONS UPDATE:Evaluate or treatment and medication management.     GOALS UPDATE:   Time frame for Short-Term Goals: 2 days    Latoya Schneider RN

## 2019-05-09 NOTE — BH NOTE
Patient refusing fingerstick. Patient BS today was 131 at [facility] er. Patient only on oral medication for diabetes. Patient displaying no s/s of hypoglycemia or hyperglycemia. Will continue to monitor.

## 2019-05-09 NOTE — GROUP NOTE
Group Therapy Note    Date: May 9    Group Start Time: 1000  Group End Time: 1050  Group Topic: Psychoeducation    [facility] OP EJ Thakur        Group Therapy Note    Attendees: 13    Group Topic: Patients were directed and encouraged to engage in a group discussion focusing on communication styles, barriers to communicating assertively and effective problem solving for dealing with difficulty conversations and interactions with others. Notes:  Krishna Ward arrived to group late but was unable to engage in group discussion or appropriate group interactions. Krishna Ward required redirection to control his intrusive behaviors when witnessed leaning on other pts who were sitting participating in group. Krishna Ward was easily redirectable but appeared severely disorganized and without reality orientation.      Status After Intervention:  Unchanged    Participation Level: None    Participation Quality: Intrusive      Speech:  No verbal interactions with others      Thought Process/Content: disorganized, RTIS,       Affective Functioning: Flat      Mood: WENDI      Level of consciousness:  Alert, Preoccupied and Inattentive      Response to Learning: unable to comprehend group topic or discussion       Endings: None Reported    Modes of Intervention: Education, Exploration, Clarifying and Problem-solving      Discipline Responsible: Psychoeducational Specialist      Signature:  Tata Vicente, MS, ATR-BC

## 2019-05-10 PROCEDURE — 6360000002 HC RX W HCPCS: Performed by: PSYCHIATRY & NEUROLOGY

## 2019-05-10 PROCEDURE — 1240000000 HC EMOTIONAL WELLNESS R&B

## 2019-05-10 PROCEDURE — 6370000000 HC RX 637 (ALT 250 FOR IP): Performed by: PSYCHIATRY & NEUROLOGY

## 2019-05-10 PROCEDURE — 99233 SBSQ HOSP IP/OBS HIGH 50: CPT | Performed by: PSYCHIATRY & NEUROLOGY

## 2019-05-10 RX ADMIN — LORAZEPAM 2 MG: 2 INJECTION INTRAMUSCULAR; INTRAVENOUS at 13:29

## 2019-05-10 RX ADMIN — RISPERIDONE 2 MG: 2 TABLET ORAL at 20:10

## 2019-05-10 RX ADMIN — DIPHENHYDRAMINE HYDROCHLORIDE 50 MG: 50 INJECTION, SOLUTION INTRAMUSCULAR; INTRAVENOUS at 13:29

## 2019-05-10 RX ADMIN — HALOPERIDOL LACTATE 10 MG: 5 INJECTION INTRAMUSCULAR at 13:29

## 2019-05-10 NOTE — BH NOTE
Pt calm, interacting appropriately with staff. No s/s of behavioral disturbances, anxiety, or agitation. Medications effective.

## 2019-05-10 NOTE — H&P
[address]                 [address] [place] [address]                              HISTORY AND PHYSICAL    PATIENT NAME: Jamie Pettit                       :        1980  MED REC NO:   [id number]                          ROOM:       2304  ACCOUNT NO:   [de-identified]                           ADMIT DATE: 2019  PROVIDER:     Homero Boas. Norma Tan MD    DATE OF EVALUATION:  2019    CHIEF COMPLAINT:  Psychosis. HISTORY OF PRESENT ILLNESS:  The patient is a 22-year-old male who  presented to [facility] on 2019 with altered mental status. He  was brought in by [facility] and he was acting very bizarrely. He was  psychotic. He stated that his medication turns him into a \"mythical  creature. \"  When he came into the ED, apparently, he was not talking. Today, when I met with him, he was mumbling, but was not able to  communicate with me, and he has a history of schizophrenia and autism. He was found wandering around his house stated he was lost out. Per  EMS, he was able to state his name, but otherwise did not participate in  the history and continued to stare blankly according to the report. When he was brought into the unit, he spit out his medication on to the  floor. Reports from staff that he appeared to be responding to internal  stimuli. He appeared distracted and disorganized. He was pacing into  other patient's rooms, generally redirectable. He appeared to  disheveled. He had milk on the floor. Apparently, he was getting into  the shower with clothes on. He was also making comments that he needed  to save mankind. When the assessment was trying to be completed by the   department, he was carrying 3 testaments, which he used  to trace the wall and outlets. He took a pillow case off the pillow and  started wiping the table, stating that he was getting rid of bugs, but  there were no bugs.   He then lay on the Reviewed. Alcohol none detected. Urine drug screen  was negative. Glucose is 119 in the ED. RADIOLOGY DATA:  CT of the head, 05/08/2019. No acute abnormality. PHYSICAL EXAMINATION:  Vital signs:  Temperature 97.7, pulse 65,  respirations 15, and blood pressure 133/81. REVIEW OF SYSTEMS:  Pertinent positives on the HPI, otherwise negative. MENTAL STATUS EXAMINATION:  The patient is a disheveled 51-year-old  white male. He made minimal eye contact. He would stare blankly at  times. He spoke with primarily grunting noises. He was trying to move  his bed into the hallway, when I met with him. He had water all over  his shirt. Apparently, he was in the shower with his clothes on. He  did not answers questions regarding self-harm or harm toward others. No  psychosis. He appeared to be highly disorganized. Did not speak. Insight and judgment severely impaired. Unable to assess for  orientation, fund of knowledge and language, attention span, memory. Affect was flattened. He did not answers questions regarding mood. Walked without any abnormalities. DIAGNOSES:  AXIS I:  1. Schizophrenia, disorganized type. 2.  Autistic spectrum disorder. AXIS II:  Deferred. AXIS III:  Diabetes. AXIS IV:  Severe. AXIS V:  25. PLAN:  1. We will continue with Risperdal 2 mg twice a day. 2.  Glucophage 500 mg twice a day. 3.  The patient has already received Haldol, Benadryl, and Ativan p.r.n.  injections today. 4.  We will need to understand when he last had his Risperdal Consta. 5.  The patient is an appropriate candidate for an injectable  long-acting medication. 6.  Discharge home once stabilized. 7.  Estimated length of stay 5 to 7 days.         Keily Child MD    D: 05/09/2019 18:49:49       T: 05/09/2019 21:26:26     [email or web address]  Job#: [id number]     Doc#: [id number]    CC:

## 2019-05-10 NOTE — PROGRESS NOTES
Department of Psychiatry  Attending Progress Note  Chief Complaint: psychosis  Erik Dahl remains psychotic but was able to communicate more effectively. He still is taking about \"mythical creatures. \" He feels that his apartment has \"human beings\" in it. He thinks that he is a mythical creature \"neanderthal.\" He would grin at times and was on  His bed. He is improved from yesterday although it is relative. To get scheduled Invega Sustenna 234 mg IM today   Patient's chart was reviewed and collaborated with  about the treatment plan. SUBJECTIVE:    Patient is feeling better. Suicidal ideation:  denies suicidal ideation. Patient does not have medication side effects. ROS: Patient has new complaints: no  Sleeping adequately:  Yes   Appetite adequate: No:   Attending groups: No:   Visitors:No    OBJECTIVE    Physical  VITALS:  /61   Pulse 56   Temp 96.2 °F (35.7 °C) (Axillary)   Resp 16   Wt 217 lb (98.4 kg)   BMI 32.99 kg/m²     Mental Status Examination:  Patients appearance was ill-appearing. Thoughts are Illogical. Homicidal ideations none. No abnormal movements, tics or mannerisms. Memory intact Aims 0. Concentration Fair. Alert and oriented X 4. Insight and Judgement delusions. Patient was cooperative.  Patient gait normal. Mood constricted, affect flat affect Hallucinations present, suicidal ideations no specific plan to harm self Speech increased latency of response and soft  Data  Labs:   Admission on 05/08/2019   Component Date Value Ref Range Status    POC Glucose 05/08/2019 110* 70 - 99 mg/dl Final    Performed on 05/08/2019 ACCU-CHEK   Final            Medications  Current Facility-Administered Medications: paliperidone palmitate ER (INVEGA SUSTENNA) IM injection 234 mg, 234 mg, Intramuscular, Once  risperiDONE (RISPERDAL) tablet 2 mg, 2 mg, Oral, BID  haloperidol lactate (HALDOL) injection 10 mg, 10 mg, Intramuscular, Q6H PRN  haloperidol (HALDOL) tablet 10 mg, 10 mg, Oral, Q6H PRN  LORazepam (ATIVAN) tablet 2 mg, 2 mg, Oral, Q6H PRN **OR** LORazepam (ATIVAN) injection 2 mg, 2 mg, Intramuscular, Q6H PRN  diphenhydrAMINE (BENADRYL) injection 50 mg, 50 mg, Intramuscular, Q6H PRN **OR** diphenhydrAMINE (BENADRYL) tablet 50 mg, 50 mg, Oral, Q6H PRN  metFORMIN (GLUCOPHAGE) tablet 500 mg, 500 mg, Oral, BID WC  acetaminophen (TYLENOL) tablet 650 mg, 650 mg, Oral, Q4H PRN  ibuprofen (ADVIL;MOTRIN) tablet 400 mg, 400 mg, Oral, Q6H PRN  hydrOXYzine (VISTARIL) capsule 50 mg, 50 mg, Oral, TID PRN  magnesium hydroxide (MILK OF MAGNESIA) 400 MG/5ML suspension 30 mL, 30 mL, Oral, Daily PRN  aluminum & magnesium hydroxide-simethicone (MAALOX) 200-200-20 MG/5ML suspension 30 mL, 30 mL, Oral, Q6H PRN  glucose (GLUTOSE) 40 % oral gel 15 g, 15 g, Oral, PRN  dextrose 50 % solution 12.5 g, 12.5 g, Intravenous, PRN  glucagon (rDNA) injection 1 mg, 1 mg, Intramuscular, PRN  dextrose 5 % solution, 100 mL/hr, Intravenous, PRN  traZODone (DESYREL) tablet 50 mg, 50 mg, Oral, Nightly PRN    ASSESSMENT AND PLAN    Principal Problem:    [facility] ([facility]  Active Problems:    Autism spectrum disorder    Type II diabetes mellitus, well controlled ([facility]  Resolved Problems:    * No resolved hospital problems. *       1. Patient s symptoms   are improving. Invega Sustenna 234 mg today . Continue with Risperdal 2 mg BID but will taper. 2.Probable discharge is next week  3. Discharge planning is incomplete  4. Suicidal ideation is better  5. Total time with patient was 40 minutes and more than 50 % of that time was spent counseling the patient on their symptoms, treatment and expected goals.

## 2019-05-10 NOTE — PLAN OF CARE
Problem: Altered Mood, Psychotic Behavior:  Goal: Absence of self-harm  Description  Absence of self-harm  5/9/2019 2225 by Jeffry Cook RN  Outcome: Ongoing  5/9/2019 0905 by Grant Barone RN  Outcome: Ongoing     Problem: Altered Mood, Psychotic Behavior:  Goal: Compliance with prescribed medication regimen will improve  Description  Compliance with prescribed medication regimen will improve  5/9/2019 2225 by Jeffry Cook RN  Outcome: Ongoing  5/9/2019 0905 by Grant Barone RN  Outcome: Ongoing     Problem: Confusion - Acute:  Goal: Absence of continued neurological deterioration signs and symptoms  Description  Absence of continued neurological deterioration signs and symptoms  5/9/2019 0905 by Grant Barone RN  Outcome: Ongoing   Pt was seen at the  desk. He accepted his HS medications from another RN without any issues. He denied any SI,HI. He was was noted to be confused to time, place and situation. He retreated to his bed shortly after this RN's arrival to unit.

## 2019-05-10 NOTE — BH NOTE
Pt offered morning scheduled medications. Pt state \"I don't need no fucking medicine dude. \" Pt returned to his room ad closed the door. Will monitor.

## 2019-05-10 NOTE — BH NOTE
Writer spoke with pt's new CM, Freddy Lezama [phone], with [facility] High [facility] team. He stated that he is available on the weekend as well if any contact is needed. He will be meeting with pt 5 days per week upon discharge.     Matt Mcdonough MSW, LSW

## 2019-05-10 NOTE — BH NOTE
Group Therapy Note      Date: 5/10/2019  Start Time: 1600  End Time:  1700  Number of Participants: 6    A [facility] volunteer came in and led group.        Signature:  Reynaldo Rider RN

## 2019-05-10 NOTE — BH NOTE
Pt exited room wet head to toe with clothing and pillow warapped around his left foot. Pt slowly laid down on floor. Pt was not moving, and staring blankly at ceiling. When staff approached pt mumbled unintelligible words. While attempting to get vitals for pt pt would not keep arm still that had cuff on it. Unable to obtain vitals. Pt able to stand-up and walk. Pt refused to answer assessments questions. Pt returned to room. Pt then returned to day area with only his shirt and underwear on. Pt laid down on the floor again. Pt offered medication for his agitation and anxiety. Pt agreeable to 10 mg Haldol IM, 2 mg Ativan IM, and 50 mg Benadryl IM, see eMAR. While administering medication pt attempted to grope a female staff member. Pt verbally redirected, and female staff member left area. Pt moved to room without a hospital bed as pt had been moving his bed around his room and hitting it against the wall. Pt continues to pace the small area spinning in circles against the wall, attempting to rearrange furniture. Pt is redirectable at times with much encouragement, though pt reverts back to behaviors quickly. Will monitor.

## 2019-05-11 PROCEDURE — 6370000000 HC RX 637 (ALT 250 FOR IP): Performed by: PSYCHIATRY & NEUROLOGY

## 2019-05-11 PROCEDURE — 99233 SBSQ HOSP IP/OBS HIGH 50: CPT | Performed by: NURSE PRACTITIONER

## 2019-05-11 PROCEDURE — 1240000000 HC EMOTIONAL WELLNESS R&B

## 2019-05-11 RX ADMIN — METFORMIN HYDROCHLORIDE 500 MG: 500 TABLET ORAL at 08:49

## 2019-05-11 RX ADMIN — RISPERIDONE 2 MG: 2 TABLET ORAL at 20:05

## 2019-05-11 RX ADMIN — RISPERIDONE 2 MG: 2 TABLET ORAL at 08:49

## 2019-05-11 RX ADMIN — METFORMIN HYDROCHLORIDE 500 MG: 500 TABLET ORAL at 17:01

## 2019-05-11 NOTE — PLAN OF CARE
Patient denies SI/HI/AVH at this time. Pt has been regressed to room and in bed this shift, except out for meals and brief periods of time. Pt compliant with meds. Pt alert and oriented x3, re-oriented to situation. Pt thinks he is at the hospital because \"I need to get my eyes examined. Will continue to monitor for safety.    Problem: Altered Mood, Psychotic Behavior:  Goal: Able to verbalize reality based thinking  Description  Able to verbalize reality based thinking  5/11/2019 1450 by Matthew Burton RN  Outcome: Ongoing

## 2019-05-11 NOTE — PLAN OF CARE
Patient was out with patient group, briefly earlier in the shift. Patient had came out once after being in the shower. Patient was directed back to his room & given a safety gown & paper pants. The water was turned off in patient's room, for the evening. Patient thought he was at the [facility]. After being oriented by writer, patient stated,\" I would of being charged with arson, if I didn't come here. \" Patient, went on to say that he came her for the rest of his family. \"I was getting sick. \" Patient appeared preoccupied, but denied having hallucinations.  Bridger Hennessy R.N.

## 2019-05-11 NOTE — BH NOTE
Per charge nurse pt was currently not group appropriate to attend reactional 1:30 group.       Noel Yi MSW,LSW

## 2019-05-11 NOTE — PROGRESS NOTES
mg Oral BID    metFORMIN  500 mg Oral BID        PRN Meds  haloperidol lactate, haloperidol, LORazepam **OR** LORazepam, diphenhydrAMINE **OR** diphenhydrAMINE, acetaminophen, ibuprofen, hydrOXYzine, magnesium hydroxide, aluminum & magnesium hydroxide-simethicone, glucose, dextrose, glucagon (rDNA), dextrose, traZODone    OBJECTIVE  Labs:  No results found for this or any previous visit (from the past 24 hour(s)). Physical Assessment:  Constitutional: No acute distress noted  HEENT: Atraumatic  Cardiovascular: VSS  Respiratory: no distress noted  Neurological: No cranial nerve abnormalities apparent  MS: No abnormalities apparent  Gait: WNL  Psychiatric: Please see below    Vital Signs:  Vitals:    05/08/19 2245 05/09/19 0755 05/09/19 2010 05/10/19 1953   BP:  133/81 117/61 121/87   Pulse:  65 56 85   Resp:  16 16 16   Temp:  97.7 °F (36.5 °C) 96.2 °F (35.7 °C) 97.1 °F (36.2 °C)   TempSrc:  Oral Axillary Axillary   Weight: 217 lb (98.4 kg)            PSYCHIATRIC ASSESSMENT   Sleep: [] 0-3  [x] 4-6 [] 7-9   [] 10+  Appetite adequate:  [x] Yes  [] No  Attending to hygiene:[x] Yes  [] No   EPS: [] Yes  [x] No  Attending groups: [] Yes  [x] No  Patient reports side effects from psychiatric medications: [] Yes [x] No   Patient on more than 1 Antipsychotic: [] Yes  [x] No    PSYCHIATRIC EXAMINATION / MENTAL STATUS EXAM:     Appearance/Hygiene:  well-appearing and hospital attire   Motor Behavior: WNL   Gait: WNL  Attitude: cooperative  Affect: labile affect   Speech: normal pitch and normal volume  Mood: mild lability   Thought Processes: disorganized  Perceptions: appears to be RTIS   Thought content: paranoid   Suicidal ideation:  no specific plan to harm self   Homicidal ideation:  none  Cognition: Symptoms are currently causing impairment   Orientation: A&Ox2  Insight: none  Judgement: poor          Diagnoses  Schizophrenia      Plan   Reviewed nursing and ancillary staff notes from last 24 hours.   Evaluated medications and assessed for side effects and effectiveness. Assessed patient's educational needs including reviewing Plan of Care, medications and diagnosis. Requires Inpatient Hospitalization as Least Restrictive Environment: YES    1. Plan: Risperdal 2 mg BID  2. Legal: Involuntary hold signed  3.  Discharge:   · Collaborate with SW to gather collateral and determine appropriate community support and disposition         Total time: 35 minutes spent with patient completing evaluation process and more than 50% of the time was spent completing evaluation and planning treatment with the patient    Dr. Michael Forde, DNP, APRN, PMHNP  05/11/19

## 2019-05-12 LAB
GLUCOSE BLD-MCNC: 122 MG/DL (ref 70–99)
PERFORMED ON: ABNORMAL

## 2019-05-12 PROCEDURE — 6370000000 HC RX 637 (ALT 250 FOR IP): Performed by: PSYCHIATRY & NEUROLOGY

## 2019-05-12 PROCEDURE — 1240000000 HC EMOTIONAL WELLNESS R&B

## 2019-05-12 RX ADMIN — RISPERIDONE 2 MG: 2 TABLET ORAL at 20:08

## 2019-05-12 RX ADMIN — TRAZODONE HYDROCHLORIDE 50 MG: 50 TABLET ORAL at 00:20

## 2019-05-12 RX ADMIN — METFORMIN HYDROCHLORIDE 500 MG: 500 TABLET ORAL at 08:37

## 2019-05-12 RX ADMIN — RISPERIDONE 2 MG: 2 TABLET ORAL at 08:37

## 2019-05-12 RX ADMIN — METFORMIN HYDROCHLORIDE 500 MG: 500 TABLET ORAL at 16:50

## 2019-05-12 NOTE — BH NOTE
Per charge nurse, patient is not currently appropriate to attend 1:15 cognitive skills group.      Mario Marino MSW,LSW

## 2019-05-12 NOTE — BH NOTE
Pt can be heard on the phone wishing people happy mother's day. Pt is so far appropriate on the phone and turned it back in without issue. Will continue to monitor.

## 2019-05-12 NOTE — PLAN OF CARE
Patient rates Depression 2/10 and Anxiety 8/10. Patient visible on the milieu. Patient attended and participated in wrap up group. Patient took HS medications. Patient watching T.V. At present. No c/o's voiced at present.

## 2019-05-12 NOTE — PLAN OF CARE
Problem: Altered Mood, Psychotic Behavior:  Goal: Absence of self-harm  Description  Absence of self-harm  Outcome: Ongoing   Patient has been regressed to room and bed, visible in milieu on small side for brief periods of time and for meals. Pt compliant with meds. Pt has remained safe and absent of self-harm this shift thus far. Safety checks completed Q15 minutes to ensure pt safety.

## 2019-05-13 LAB
GLUCOSE BLD-MCNC: 142 MG/DL (ref 70–99)
PERFORMED ON: ABNORMAL

## 2019-05-13 PROCEDURE — 1240000000 HC EMOTIONAL WELLNESS R&B

## 2019-05-13 PROCEDURE — 6370000000 HC RX 637 (ALT 250 FOR IP): Performed by: PSYCHIATRY & NEUROLOGY

## 2019-05-13 PROCEDURE — 99233 SBSQ HOSP IP/OBS HIGH 50: CPT | Performed by: PSYCHIATRY & NEUROLOGY

## 2019-05-13 RX ADMIN — RISPERIDONE 2 MG: 2 TABLET ORAL at 20:16

## 2019-05-13 RX ADMIN — RISPERIDONE 2 MG: 2 TABLET ORAL at 08:13

## 2019-05-13 RX ADMIN — METFORMIN HYDROCHLORIDE 500 MG: 500 TABLET ORAL at 17:13

## 2019-05-13 RX ADMIN — METFORMIN HYDROCHLORIDE 500 MG: 500 TABLET ORAL at 08:13

## 2019-05-13 NOTE — PROGRESS NOTES
Department of Psychiatry  Attending Progress Note  Chief Complaint: psychosis  Xochitl Weaver remains psychotic as he spoke about having 2 wivws andn that they were \"mating \" in the other room. He talked about hsi bed appearing like a coffin. He also stated that his wives are Jan Torres and Baron Saha and that they\" are from the skin of his seed. \" Tolerating meds thus far. Signed voluntary . Patient's chart was reviewed and collaborated with  about the treatment plan. SUBJECTIVE:    Patient is feeling unchanged. Suicidal ideation:  denies suicidal ideation. Patient does not have medication side effects. ROS: Patient has new complaints: no  Sleeping adequately:  Yes   Appetite adequate: Yes  Attending groups: No:   Visitors:No    OBJECTIVE    Physical  VITALS:  /69   Pulse 61   Temp 97.4 °F (36.3 °C) (Oral)   Resp 16   Wt 217 lb (98.4 kg)   BMI 32.99 kg/m²     Mental Status Examination:  Patients appearance was ill-appearing. Thoughts are Illogical. Homicidal ideations none. No abnormal movements, tics or mannerisms. Memory impaired Aims 0. Concentration Fair. Alert and oriented X 4. Insight and Judgement delusions. Patient was cooperative.  Patient gait normal. Mood labile, affect flat affect Hallucinations Absent, suicidal ideations no specific plan to harm self Speech normal volume  Data  Labs:   Admission on 05/08/2019   Component Date Value Ref Range Status    POC Glucose 05/08/2019 110* 70 - 99 mg/dl Final    Performed on 05/08/2019 ACCU-CHEK   Final    POC Glucose 05/12/2019 122* 70 - 99 mg/dl Final    Performed on 05/12/2019 ACCU-CHEK   Final    POC Glucose 05/13/2019 142* 70 - 99 mg/dl Final    Performed on 05/13/2019 ACCU-CHEK   Final            Medications  Current Facility-Administered Medications: risperiDONE (RISPERDAL) tablet 2 mg, 2 mg, Oral, BID  haloperidol lactate (HALDOL) injection 10 mg, 10 mg, Intramuscular, Q6H PRN  haloperidol (HALDOL) tablet 10 mg, 10 mg, Oral, Q6H

## 2019-05-13 NOTE — PLAN OF CARE
Patient rates Depression 10/10 and Anxiety 7/10. Patient denies SI/HI/V/H. Patient states; \"I am hearing voices over the radio making fun of my women. \" Patient visible on the milieu. Patient keeps to himself. Patient attended and participated in wrap up group. Patient took HS medications. Patient on the big side. No c/o's voiced at present.

## 2019-05-13 NOTE — GROUP NOTE
Group Therapy Note    Date: May 12    Group Start Time: 1945  Group End Time: 2010  Group Topic: [facility]        Group Therapy Note    Attendees: goals and importance of goal setting discussed.   Night time milieu activities discussed         Patient's Goal:  \"accomplish\"    Notes:  Couldn't elaborate    Status After Intervention:  Improved    Participation Level: Minimal    Participation Quality: Inappropriate      Speech:  normal      Thought Process/Content: Delusional  Hallucinating  Flight of ideas      Affective Functioning: Incongruent      Mood: irritable      Level of consciousness:  Preoccupied and Inattentive      Response to Learning: Progressing to goal      Endings: None Reported    Modes of Intervention: Support      Discipline Responsible: [address]      Signature:  Domonique Love Afib Chronic heart failure, unspecified heart failure type

## 2019-05-14 PROCEDURE — 99233 SBSQ HOSP IP/OBS HIGH 50: CPT | Performed by: PSYCHIATRY & NEUROLOGY

## 2019-05-14 PROCEDURE — 1240000000 HC EMOTIONAL WELLNESS R&B

## 2019-05-14 PROCEDURE — 6370000000 HC RX 637 (ALT 250 FOR IP): Performed by: PSYCHIATRY & NEUROLOGY

## 2019-05-14 RX ADMIN — RISPERIDONE 2 MG: 2 TABLET ORAL at 09:19

## 2019-05-14 RX ADMIN — HALOPERIDOL 10 MG: 10 TABLET ORAL at 13:24

## 2019-05-14 RX ADMIN — RISPERIDONE 2 MG: 2 TABLET ORAL at 20:16

## 2019-05-14 RX ADMIN — LORAZEPAM 2 MG: 2 TABLET ORAL at 13:24

## 2019-05-14 RX ADMIN — DIPHENHYDRAMINE HCL 50 MG: 25 TABLET ORAL at 13:24

## 2019-05-14 RX ADMIN — METFORMIN HYDROCHLORIDE 500 MG: 500 TABLET ORAL at 09:19

## 2019-05-14 NOTE — BH NOTE
Approached writer to encourage him to take medications. 2nd nurse able to convince patient to take PRN haldol, ativan and benadryl. Patient's room lights turned out per request, door closed and patient will continue to be monitored.

## 2019-05-14 NOTE — PLAN OF CARE
Patient was regressed to his room & bed most of the shift. Patient was cooperative with vital & medications. Patient with loose thoughts during 1:1.\"I'm confused to who I  because of her birthday. \" This was after writer asked patient when his birthday was. Patient stated that he came to the hospital, because he had a super induced sexual desire. Patient denied having hallucinations, but appeared preoccupied.  Mikaela Hennessy R.N.

## 2019-05-14 NOTE — PLAN OF CARE
Problem: Altered Mood, Psychotic Behavior:  Goal: Ability to interact with others will improve  Outcome: Met This Shift  Note:   Patient came out of his room and requested finger nail clippers. Writer explained to patient that we do not have finger nail clippers on the unit. Patient the stated, \"I get treated like a dog here! \"  He walked into room, then came back with all his linens, towels and gowns. He threw them up on the nursing station counter. He then stated, \"If I run around here naked would anyone care? He then came out of room, glared toward the nursing station and slammed his door. Will continue to monitor for treatable symptoms.

## 2019-05-14 NOTE — PROGRESS NOTES
Department of Psychiatry  Attending Progress Note  Chief Complaint: psychosis  Tang Mueller remains psychotic with disorganized thoughts and delusions. He stated that his 2 wives are now next door and \"spooning with peanut butter. \" Their names are now Brady Drier and Nick Camera. He was starting to eat his breakfast but had difficulty attending to the task of eating. Showing minimal improvement although he is less agitated. Patient's chart was reviewed and collaborated with  about the treatment plan. SUBJECTIVE:    Patient is feeling unchanged. Suicidal ideation:  denies suicidal ideation. Patient does not have medication side effects. ROS: Patient has new complaints: no  Sleeping adequately:  Yes   Appetite adequate: No:   Attending groups: No:   Visitors:No    OBJEC    Physical  VITALS:  /73   Pulse 65   Temp 97.7 °F (36.5 °C) (Oral)   Resp 16   Wt 217 lb (98.4 kg)   BMI 32.99 kg/m²     Mental Status Examination:  Patients appearance was ill-appearing. Thoughts are Illogical. Homicidal ideations none. No abnormal movements, tics or mannerisms. Memory intact Aims 0. Concentration Poor. Alert and oriented X 4. Insight and Judgement delusions. Patient was uncooperative.  Patient gait normal. Mood constricted, affect flat affect Hallucinations present, suicidal ideations no specific plan to harm self Speech soft  Data  Labs:   Admission on 05/08/2019   Component Date Value Ref Range Status    POC Glucose 05/08/2019 110* 70 - 99 mg/dl Final    Performed on 05/08/2019 ACCU-CHEK   Final    POC Glucose 05/12/2019 122* 70 - 99 mg/dl Final    Performed on 05/12/2019 ACCU-CHEK   Final    POC Glucose 05/13/2019 142* 70 - 99 mg/dl Final    Performed on 05/13/2019 ACCU-CHEK   Final            Medications  Current Facility-Administered Medications: risperiDONE (RISPERDAL) tablet 2 mg, 2 mg, Oral, BID  haloperidol lactate (HALDOL) injection 10 mg, 10 mg, Intramuscular, Q6H PRN  haloperidol (HALDOL) tablet 10 mg, 10 mg, Oral, Q6H PRN  LORazepam (ATIVAN) tablet 2 mg, 2 mg, Oral, Q6H PRN **OR** LORazepam (ATIVAN) injection 2 mg, 2 mg, Intramuscular, Q6H PRN  diphenhydrAMINE (BENADRYL) injection 50 mg, 50 mg, Intramuscular, Q6H PRN **OR** diphenhydrAMINE (BENADRYL) tablet 50 mg, 50 mg, Oral, Q6H PRN  metFORMIN (GLUCOPHAGE) tablet 500 mg, 500 mg, Oral, BID WC  acetaminophen (TYLENOL) tablet 650 mg, 650 mg, Oral, Q4H PRN  ibuprofen (ADVIL;MOTRIN) tablet 400 mg, 400 mg, Oral, Q6H PRN  hydrOXYzine (VISTARIL) capsule 50 mg, 50 mg, Oral, TID PRN  magnesium hydroxide (MILK OF MAGNESIA) 400 MG/5ML suspension 30 mL, 30 mL, Oral, Daily PRN  aluminum & magnesium hydroxide-simethicone (MAALOX) 200-200-20 MG/5ML suspension 30 mL, 30 mL, Oral, Q6H PRN  glucose (GLUTOSE) 40 % oral gel 15 g, 15 g, Oral, PRN  dextrose 50 % solution 12.5 g, 12.5 g, Intravenous, PRN  glucagon (rDNA) injection 1 mg, 1 mg, Intramuscular, PRN  dextrose 5 % solution, 100 mL/hr, Intravenous, PRN  traZODone (DESYREL) tablet 50 mg, 50 mg, Oral, Nightly PRN    ASSESSMENT AND PLAN    Principal Problem:    Schizophrenia ([facility]  Active Problems:    Autism spectrum disorder    Type II diabetes mellitus, well controlled ([facility]  Resolved Problems:    * No resolved hospital problems. *       1. Patient s symptoms   are improving minimally  2. Probable discharge is next week  3. Discharge planning is incomplete  4. Suicidal ideation is better  5. Total time with patient was 40 minutes and more than 50 % of that time was spent counseling the patient on their symptoms, treatment and expected goals.

## 2019-05-14 NOTE — PROGRESS NOTES
Occupational Therapy      Attempted OT eval.  Pt. Refused to open his eyes or acknowledge therapist.  Nsg notified. Plan to re-attempt tomorrow.     Marixa Pride, OTR/L  #[id number]

## 2019-05-14 NOTE — PROGRESS NOTES
Nutrition Assessment (Low Risk)    Type and Reason for Visit: Initial(LOS x 6 days )    Nutrition Recommendations:   1. Continue CCC-4 diet order. 2. Monitor appetite, po intake, and mental status. Nutrition Assessment:  Patient assessed for nutritional risk. Deemed to be at low risk at this time. Will continue to monitor for changes in status.  patient was nutritionally compromised upon admission AEB significant mental decline/decompensation, however, he has improved from a nutritional standpoint and is not at risk for nutritional compromise at this time; will continue CCC-4 diet order    Malnutrition Assessment:  · Malnutrition Status: No malnutrition    Nutrition Risk Level   Risk Level: Low    Nutrition Diagnosis:   · Problem: No nutrition diagnosis at this time      Nutrition Intervention:  Food and/or Delivery: Continue current diet  Nutrition Education/Counseling/Coordination of Care:  Continued Inpatient Monitoring, Coordination of Care, Coordination of Community Care      Electronically signed by Sam Medel RD, LD on 5/14/19 at 2:07 PM    Contact Number: [phone]

## 2019-05-15 PROCEDURE — 1240000000 HC EMOTIONAL WELLNESS R&B

## 2019-05-15 PROCEDURE — 99233 SBSQ HOSP IP/OBS HIGH 50: CPT | Performed by: PSYCHIATRY & NEUROLOGY

## 2019-05-15 PROCEDURE — 6360000002 HC RX W HCPCS: Performed by: PSYCHIATRY & NEUROLOGY

## 2019-05-15 PROCEDURE — 6370000000 HC RX 637 (ALT 250 FOR IP): Performed by: PSYCHIATRY & NEUROLOGY

## 2019-05-15 RX ADMIN — RISPERIDONE 2 MG: 2 TABLET ORAL at 07:51

## 2019-05-15 RX ADMIN — HALOPERIDOL 10 MG: 10 TABLET ORAL at 11:47

## 2019-05-15 RX ADMIN — METFORMIN HYDROCHLORIDE 500 MG: 500 TABLET ORAL at 07:51

## 2019-05-15 RX ADMIN — METFORMIN HYDROCHLORIDE 500 MG: 500 TABLET ORAL at 16:09

## 2019-05-15 RX ADMIN — RISPERIDONE 2 MG: 2 TABLET ORAL at 19:44

## 2019-05-15 RX ADMIN — RISPERIDONE 25 MG: KIT at 11:02

## 2019-05-15 RX ADMIN — LORAZEPAM 2 MG: 2 TABLET ORAL at 11:47

## 2019-05-15 RX ADMIN — DIPHENHYDRAMINE HCL 50 MG: 25 TABLET ORAL at 11:47

## 2019-05-15 NOTE — PROGRESS NOTES
Department of Psychiatry  Attending Progress Note  Chief Complaint: psychosis  Jyothi Crespo remains psychotic and is making odd, bizarre statements about \" His wives. \"  He was later observed to naked in his room. Then angry with putting on his sheets. He did receieved Risperdal Consta 25 mg IM today along with his 2 mg BID. He is showing minimal improvement thus far. Patient's chart was reviewed and collaborated with  about the treatment plan. SUBJECTIVE:    Patient is feeling unchanged. Suicidal ideation:  denies suicidal ideation. Patient does not have medication side effects. ROS: Patient has new complaints: no  Sleeping adequately:  Yes   Appetite adequate: No:   Attending groups: No:   Visitors:No    OBJECTIVE    Physical  VITALS:  /63   Pulse 62   Temp 97.7 °F (36.5 °C) (Oral)   Resp 16   Wt 217 lb (98.4 kg)   BMI 32.99 kg/m²     Mental Status Examination:  Patients appearance was ill-appearing. Thoughts are Illogical. Homicidal ideations none. No abnormal movements, tics or mannerisms. Memory impaired Aims 0. Concentration Poor. Alert and oriented X 4. Insight and Judgement delusions. Patient was uncooperative.  Patient gait normal. Mood labile, affect labile affect Hallucinations Calista, suicidal ideations no specific plan to harm self Speech soft  Data  Labs:   Admission on 05/08/2019   Component Date Value Ref Range Status    POC Glucose 05/08/2019 110* 70 - 99 mg/dl Final    Performed on 05/08/2019 ACCU-CHEK   Final    POC Glucose 05/12/2019 122* 70 - 99 mg/dl Final    Performed on 05/12/2019 ACCU-CHEK   Final    POC Glucose 05/13/2019 142* 70 - 99 mg/dl Final    Performed on 05/13/2019 ACCU-CHEK   Final            Medications  Current Facility-Administered Medications: risperiDONE microspheres (RISPERDAL CONSTA) injection 25 mg, 25 mg, Intramuscular, Q14 Days  risperiDONE (RISPERDAL) tablet 2 mg, 2 mg, Oral, BID  haloperidol lactate (HALDOL) injection 10 mg, 10 mg, Intramuscular, Q6H PRN  haloperidol (HALDOL) tablet 10 mg, 10 mg, Oral, Q6H PRN  LORazepam (ATIVAN) tablet 2 mg, 2 mg, Oral, Q6H PRN **OR** LORazepam (ATIVAN) injection 2 mg, 2 mg, Intramuscular, Q6H PRN  diphenhydrAMINE (BENADRYL) injection 50 mg, 50 mg, Intramuscular, Q6H PRN **OR** diphenhydrAMINE (BENADRYL) tablet 50 mg, 50 mg, Oral, Q6H PRN  metFORMIN (GLUCOPHAGE) tablet 500 mg, 500 mg, Oral, BID WC  acetaminophen (TYLENOL) tablet 650 mg, 650 mg, Oral, Q4H PRN  ibuprofen (ADVIL;MOTRIN) tablet 400 mg, 400 mg, Oral, Q6H PRN  hydrOXYzine (VISTARIL) capsule 50 mg, 50 mg, Oral, TID PRN  magnesium hydroxide (MILK OF MAGNESIA) 400 MG/5ML suspension 30 mL, 30 mL, Oral, Daily PRN  aluminum & magnesium hydroxide-simethicone (MAALOX) 200-200-20 MG/5ML suspension 30 mL, 30 mL, Oral, Q6H PRN  glucose (GLUTOSE) 40 % oral gel 15 g, 15 g, Oral, PRN  dextrose 50 % solution 12.5 g, 12.5 g, Intravenous, PRN  glucagon (rDNA) injection 1 mg, 1 mg, Intramuscular, PRN  dextrose 5 % solution, 100 mL/hr, Intravenous, PRN  traZODone (DESYREL) tablet 50 mg, 50 mg, Oral, Nightly PRN    ASSESSMENT AND PLAN    Principal Problem:    Schizophrenia ([facility], [place] [address]  Active Problems:    Autism spectrum disorder    Type II diabetes mellitus, well controlled ([facility], [place] [address]  Resolved Problems:    * No resolved hospital problems. *       1. Patient s symptoms   show no change. Added Risperdal Consta 25 mg IM and  continue 2 mg BID  2. Probable discharge is next week  3. Discharge planning is incomplete  4. Suicidal ideation is better  5. Total time with patient was 40 minutes and more than 50 % of that time was spent counseling the patient on their symptoms, treatment and expected goals.

## 2019-05-15 NOTE — PLAN OF CARE
Problem: Altered Mood, Psychotic Behavior:  Goal: Able to verbalize decrease in frequency and intensity of hallucinations  Description  Able to verbalize decrease in frequency and intensity of hallucinations  Outcome: Ongoing  Patient appears to be responding to internal stimuli as evidenced by thought blocking, and he appears preoccupied when trying to answer questions. Goal: Able to verbalize reality based thinking  Description  Able to verbalize reality based thinking  Outcome: Ongoing  He is not having reality based thinking. He is paranoid and guarded. He said that he is getting angry about being in the hospital for a long stay. We discussed that we won't keep him here any longer than he needs to be. He then said, \"I can't drive so it doesn't make any difference. \" Patient then began to talk about \"penis's and vagina's\". ..and he can't have sex with his mother because she is his mother. and he said that everyone gets upset because he talks about these. Goal: Absence of self-harm  Description  Absence of self-harm  Outcome: Ongoing  Patient denied SI/HI this evening. Goal: Ability to interact with others will improve  Description  Ability to interact with others will improve  Outcome: Ongoing  Patient had been calling random numbers on the phone today and talking to who ever answered the phone. During initial 1:1 Patient was withdrawn to his room. He became verbally threatening to staff and then started talking about odd things, yelling out that he didn't want to be raped by . ..then he said a number of names. Assured patient that he is safe in the [facility]. Goal: Compliance with prescribed medication regimen will improve  Description  Compliance with prescribed medication regimen will improve  5/15/2019 1946 by Regina Burnett RN  Outcome: Ongoing  Patient requested to take his medication early this evening as he said he would go to sleep. Medication provided.  Patient did appear to take the medication appropriately.

## 2019-05-15 NOTE — PLAN OF CARE
Patient was regressed to his room & bed most of the shift. Patient was medication compliant. Patient ate a sandwich for his evening snack. Patient was with limited verbalization during 1:1. Patient with disorganized thoughts. Patient stated that he was here, because it was his 43th birthday. Patient denied having hallucinations, but appeared preoccupied & slow to respond.  Vanessa Hennessy R.N.

## 2019-05-15 NOTE — BH NOTE
Patient using the telephone, during his phone conversation writer heard patient given out the phone number [phone] several times. A female voice could be heard on the other end of the phone. Towards the end of the conversation, Amanda Carrion could heard patient arguing with the person regarding hanging up and patient attempting to explain to the person on the other line who he is. Patient unable to tell writer whom he was speaking with. Patient did not have any phone numbers in front of him. Writer suspects patient is calling random numbers and talking to whom ever answers. Patient refuses to answer writer's questions at this time.

## 2019-05-15 NOTE — PLAN OF CARE
Within Defined Limits         Patient Currently in Pain: Denies  Daily Nutrition (WDL): Within Defined Limits  Appetite Change: Normal for patient  Barriers to Nutrition: None  Level of Assistance: Independent/Self    Patient Monitoring:  Frequency of Checks: 4 times per hour, close    Psychiatric Symptoms:   Depression Symptoms  Depression Symptoms: Isolative, Increased irritability, Impaired concentration  Anxiety Symptoms  Anxiety Symptoms: Generalized  Irma Symptoms  Irma Symptoms: Poor judgment     Psychosis Symptoms  Delusion Type: No problems reported or observed.     Suicide Risk CSSR-S:  1) Within the past month, have you wished you were dead or wished you could go to sleep and not wake up? : (WENDI)  2) Have you actually had any thoughts of killing yourself? : (WENDI)  6) Have you ever done anything, started to do anything, or prepared to do anything to end your life?: (WENDI)  Change in Result none Change in Plan of care none    EDUCATION:   Learner Progress Toward Treatment Goals: Reviewed goals and plan of care    Method: Individual    Outcome: Verbalized understanding    PATIENT GOALS: none    PLAN/TREATMENT RECOMMENDATIONS UPDATE: continue treatment    GOALS UPDATE:  Time frame for Short-Term Goals: 2 days      Emmie Lowe RN

## 2019-05-15 NOTE — BH NOTE
Patient is beginning to increase motor activity and seems to be agitated. Patient moved his bed to the other side of the room. Under the bed, was a moderate amount trash and dust.  Patient demanded that his room be cleaned. Writer called housekeeping which cleaned the floor in patient's room. Patient then is putting sheet on his bed and became angry with self due to not being able to put the sheets on straight without and wrinkles. Offered patient PRN medications: Ativan, Haldol and Benadry orally and he took them without difficulty. Will monitor for effectiveness of medications. Patient is currently eating lunch at the table with a peer.

## 2019-05-15 NOTE — PLAN OF CARE
Patient will still not tolerate physical exam at this point. Discussed any concerns with nursing staff, no current concerns. Observed patient walking around in his room, drinking water. Nursing reports that he has been eating well. Vitals are wnl.      I will continue to follow and perform PE when tolerated by patient     Gregorio Zacarias PA-C  5/15/2019 3:51 PM

## 2019-05-15 NOTE — PLAN OF CARE
Problem: Altered Mood, Psychotic Behavior:  Goal: Compliance with prescribed medication regimen will improve  Outcome: Ongoing  Note:   Patient took AM medications, placed them in his mouth, then spit 1/2 of the Risperdal out into the cup and refused to take it after several attempts. Will monitor for treatable symptoms.

## 2019-05-16 LAB
GLUCOSE BLD-MCNC: 119 MG/DL (ref 70–99)
PERFORMED ON: ABNORMAL

## 2019-05-16 PROCEDURE — 1240000000 HC EMOTIONAL WELLNESS R&B

## 2019-05-16 PROCEDURE — 6370000000 HC RX 637 (ALT 250 FOR IP): Performed by: PSYCHIATRY & NEUROLOGY

## 2019-05-16 PROCEDURE — 99233 SBSQ HOSP IP/OBS HIGH 50: CPT | Performed by: PSYCHIATRY & NEUROLOGY

## 2019-05-16 RX ADMIN — RISPERIDONE 2 MG: 2 TABLET ORAL at 09:30

## 2019-05-16 RX ADMIN — DIPHENHYDRAMINE HCL 50 MG: 25 TABLET ORAL at 16:46

## 2019-05-16 RX ADMIN — METFORMIN HYDROCHLORIDE 500 MG: 500 TABLET ORAL at 09:29

## 2019-05-16 RX ADMIN — LORAZEPAM 2 MG: 2 TABLET ORAL at 16:46

## 2019-05-16 RX ADMIN — RISPERIDONE 2 MG: 2 TABLET ORAL at 20:55

## 2019-05-16 RX ADMIN — HALOPERIDOL 10 MG: 10 TABLET ORAL at 16:46

## 2019-05-16 RX ADMIN — METFORMIN HYDROCHLORIDE 500 MG: 500 TABLET ORAL at 17:11

## 2019-05-16 RX ADMIN — TRAZODONE HYDROCHLORIDE 50 MG: 50 TABLET ORAL at 20:55

## 2019-05-16 NOTE — BH NOTE
Brought cups from his drinks to the team station \"I'm waiting for further instructions\". Instructed to sit the cups on the counter which he did and then returned to sit in dinning chair, holding his hands toghter and staring down.

## 2019-05-16 NOTE — BH NOTE
Another peer approached this writer and reported pt was in the refrig. Noted pulling the crisper drawers in and out then took items from the door and placed them in the freezer. When this writer approached pt and asked what he was doing \"I'm fixing it\", as he was turning the knobs. Charge nurse and this writer escorted pt to the smaller side of the unit to aid in decreasing stimulation. When asked if he would like to watch TV \"I'm watching TV now\" as he was looking at this writer and . Cooperative in returning to the small side of the unit.

## 2019-05-16 NOTE — PROGRESS NOTES
1:1 Assessment 10 minutes. Patient is alert and oriented x 2, reoriented to time and situation. Uses indirect eye contact and is dressed appropriately in street clothing. Patient denied SI/HI,A/V hallucinations. The patient stated his mood was:\"I've been here too long , you people are keeping me here too long! \". Patient didn't attend groups this shift as he is unable to participate appropriately. The patient doesn't understand why he is here. Patient is medication compliant. Judgement,insight and impulse control are impaired. Patient denied any physical complaints this evening. Vital signs are stable. Safety checks continue Q 15 minutes throughout the shift. PRNS this shift? No.  Patient obtained *** hours of sleep this shift.

## 2019-05-16 NOTE — BH NOTE
Sitting at a dinning table flipping a tissue box mumbling about taking his wife to the hospital. Tense. Moving legs up and down. Face is flushing, holding a cup firmly and staring while muttering. Compliant in taking Haldol 10 mg PO, Benadryl 50 mg PO and Ativan 2 mg PO. \"They tell me when I eat I need to puke it up\". After taking the medication returned to the chair he was sitting in and sat down again staring and muttering.

## 2019-05-16 NOTE — PROGRESS NOTES
Department of Psychiatry  Attending Progress Note  Chief Complaint: psychosis  Erik Dahl remains psychotic and not interacting with ot patients I group or on unit. He moved his bed to be near the door. He appeared to be grinning with no clear reason. He stated that \" I feel that I'm putting myself in a coffee and tobacco situation. \" Tolerated the Risperdal Consta 25 mg IM  Patient's chart was reviewed and collaborated with  about the treatment plan. SUBJECTIVE:    Patient is feeling unchanged. Suicidal ideation:  denies suicidal ideation. Patient does not have medication side effects. ROS: Patient has new complaints: no  Sleeping adequately:  Yes   Appetite adequate: Yes  Attending groups: No:   Visitors:No    OBJECTIVE    Physical  VITALS:  /65   Pulse 62   Temp 97.6 °F (36.4 °C) (Oral)   Resp 16   Wt 217 lb (98.4 kg)   BMI 32.99 kg/m²     Mental Status Examination:  Patients appearance was ill-appearing. Thoughts are Illogical. Homicidal ideations none. No abnormal movements, tics or mannerisms. Memory intact Aims 0. Concentration Fair. Alert and oriented X 4. Insight and Judgement impaired insight. Patient was uncooperative.  Patient gait normal. Mood constricted, affect flat affect Hallucinations Absent, suicidal ideations no specific plan to harm self Speech soft  Data  Labs:   Admission on 05/08/2019   Component Date Value Ref Range Status    POC Glucose 05/08/2019 110* 70 - 99 mg/dl Final    Performed on 05/08/2019 ACCU-CHEK   Final    POC Glucose 05/12/2019 122* 70 - 99 mg/dl Final    Performed on 05/12/2019 ACCU-CHEK   Final    POC Glucose 05/13/2019 142* 70 - 99 mg/dl Final    Performed on 05/13/2019 ACCU-CHEK   Final    POC Glucose 05/16/2019 119* 70 - 99 mg/dl Final    Performed on 05/16/2019 ACCU-CHEK   Final            Medications  Current Facility-Administered Medications: risperiDONE microspheres (RISPERDAL CONSTA) injection 25 mg, 25 mg, Intramuscular, Q14

## 2019-05-16 NOTE — PLAN OF CARE
Problem: Altered Mood, Psychotic Behavior:  Goal: Compliance with prescribed medication regimen will improve  Description  Compliance with prescribed medication regimen will improve  5/16/2019 0932 by Mercedes Monzon LPN  Outcome: Met This Shift  Note:   Out of room and ate morning meal in the dinning area then returned to assigned room and laid down. Did not take scheduled medication until 0930. Medication compliant at this time. 5/15/2019 1946 by Fely Sykes RN  Outcome: Ongoing     Problem: Sleep Pattern Disturbance:  Goal: Appears well-rested  Description  Appears well-rested  Outcome: Ongoing  Note:   Reported \"I'm catching up on my sleep\". When asked if he was replied \"yeah, I guess so\" then returned to bed.

## 2019-05-16 NOTE — PROGRESS NOTES
Occupational Therapy      Attempted OT eval.  Hold per nsg secondary to patient not appropriate at this time.     Cisco Apple, OTR/L  #[id number]

## 2019-05-17 PROCEDURE — 1240000000 HC EMOTIONAL WELLNESS R&B

## 2019-05-17 PROCEDURE — 99999 PR OFFICE/OUTPT VISIT,PROCEDURE ONLY: CPT | Performed by: PSYCHIATRY & NEUROLOGY

## 2019-05-17 PROCEDURE — 6370000000 HC RX 637 (ALT 250 FOR IP): Performed by: PSYCHIATRY & NEUROLOGY

## 2019-05-17 PROCEDURE — 99233 SBSQ HOSP IP/OBS HIGH 50: CPT | Performed by: PSYCHIATRY & NEUROLOGY

## 2019-05-17 RX ADMIN — METFORMIN HYDROCHLORIDE 500 MG: 500 TABLET ORAL at 17:06

## 2019-05-17 RX ADMIN — RISPERIDONE 2 MG: 2 TABLET ORAL at 08:39

## 2019-05-17 RX ADMIN — METFORMIN HYDROCHLORIDE 500 MG: 500 TABLET ORAL at 08:39

## 2019-05-17 RX ADMIN — LORAZEPAM 2 MG: 2 TABLET ORAL at 04:58

## 2019-05-17 RX ADMIN — RISPERIDONE 2 MG: 2 TABLET ORAL at 19:48

## 2019-05-17 RX ADMIN — DIPHENHYDRAMINE HCL 50 MG: 25 TABLET ORAL at 04:58

## 2019-05-17 NOTE — PROGRESS NOTES
This writer assumed care of patient at 1. Patient was calmly sitting in dining room at that time. Patient has interacted appropriately with staff this shift with no unsafe behaviors noted. He was not observed interacting with any peers. Patient with flat affect, constricted, guarded and thought blocking. Patient denied AVH but did appear distracted and preoccupied. Patient talking nonsensical at times and when talking about why he was brought here he said \" I was just trying to get someone pregnant\". Patient with no physical complaints. Patient medication complaint and also received trazodone 50 mg oral at 2055 which was effective. Patient has been sleeping with respirations even and easy and no complaints.

## 2019-05-17 NOTE — PROGRESS NOTES
Department of Psychiatry  Attending Progress Note  Chief Complaint: psychosis  Disorganized and unable to manage in the general milieu. He is tangential and has a constricted affect. Had Ativan , Benadryl earlier this AM.   Has limited insight and is not able to be managed with others . To remain on the small side  Patient's chart was reviewed and collaborated with  about the treatment plan. SUBJECTIVE:    Patient is feeling unchanged. Suicidal ideation:  denies suicidal ideation. Patient does not have medication side effects. ROS: Patient has new complaints: no  Sleeping adequately:  Yes   Appetite adequate: Yes  Attending groups: No:   Visitors:No    OBJECTIVE    Physical  VITALS:  /66   Pulse 58   Temp 98.7 °F (37.1 °C) (Oral)   Resp 16   Wt 217 lb (98.4 kg)   BMI 32.99 kg/m²     Mental Status Examination:  Patients appearance was ill-appearing. Thoughts are Illogical. Homicidal ideations none. No abnormal movements, tics or mannerisms. Memory impaired Aims 0. Concentration Fair. Alert and oriented X 4. Insight and Judgement impaired insight. Patient was uncooperative.  Patient gait normal. Mood constricted, affect flat affect Hallucinations Absent, suicidal ideations no specific plan to harm self Speech soft  Data  Labs:   Admission on 05/08/2019   Component Date Value Ref Range Status    POC Glucose 05/08/2019 110* 70 - 99 mg/dl Final    Performed on 05/08/2019 ACCU-CHEK   Final    POC Glucose 05/12/2019 122* 70 - 99 mg/dl Final    Performed on 05/12/2019 ACCU-CHEK   Final    POC Glucose 05/13/2019 142* 70 - 99 mg/dl Final    Performed on 05/13/2019 ACCU-CHEK   Final    POC Glucose 05/16/2019 119* 70 - 99 mg/dl Final    Performed on 05/16/2019 ACCU-CHEK   Final            Medications  Current Facility-Administered Medications: risperiDONE microspheres (RISPERDAL CONSTA) injection 25 mg, 25 mg, Intramuscular, Q14 Days  risperiDONE (RISPERDAL) tablet 2 mg, 2 mg, Oral, BID  haloperidol lactate (HALDOL) injection 10 mg, 10 mg, Intramuscular, Q6H PRN  haloperidol (HALDOL) tablet 10 mg, 10 mg, Oral, Q6H PRN  LORazepam (ATIVAN) tablet 2 mg, 2 mg, Oral, Q6H PRN **OR** LORazepam (ATIVAN) injection 2 mg, 2 mg, Intramuscular, Q6H PRN  diphenhydrAMINE (BENADRYL) injection 50 mg, 50 mg, Intramuscular, Q6H PRN **OR** diphenhydrAMINE (BENADRYL) tablet 50 mg, 50 mg, Oral, Q6H PRN  metFORMIN (GLUCOPHAGE) tablet 500 mg, 500 mg, Oral, BID WC  acetaminophen (TYLENOL) tablet 650 mg, 650 mg, Oral, Q4H PRN  ibuprofen (ADVIL;MOTRIN) tablet 400 mg, 400 mg, Oral, Q6H PRN  hydrOXYzine (VISTARIL) capsule 50 mg, 50 mg, Oral, TID PRN  magnesium hydroxide (MILK OF MAGNESIA) 400 MG/5ML suspension 30 mL, 30 mL, Oral, Daily PRN  aluminum & magnesium hydroxide-simethicone (MAALOX) 200-200-20 MG/5ML suspension 30 mL, 30 mL, Oral, Q6H PRN  glucose (GLUTOSE) 40 % oral gel 15 g, 15 g, Oral, PRN  dextrose 50 % solution 12.5 g, 12.5 g, Intravenous, PRN  glucagon (rDNA) injection 1 mg, 1 mg, Intramuscular, PRN  dextrose 5 % solution, 100 mL/hr, Intravenous, PRN  traZODone (DESYREL) tablet 50 mg, 50 mg, Oral, Nightly PRN    ASSESSMENT AND PLAN    Principal Problem:    Schizophrenia ([facility] [address]  Active Problems:    Autism spectrum disorder    Type II diabetes mellitus, well controlled ([facility] [address]  Resolved Problems:    * No resolved hospital problems. *       1. Patient s symptoms   show no change, Had Risperdal Consta 25 mg 5/15. plus risperdal 3 mg BID which was increased today  2. Probable discharge is next week  3. Discharge planning is incomplete  4. Suicidal ideation is better  5. Total time with patient was 40 minutes and more than 50 % of that time was spent counseling the patient on their symptoms, treatment and expected goals.

## 2019-05-17 NOTE — PLAN OF CARE
Problem: Altered Mood, Psychotic Behavior:  Goal: Absence of self-harm  Description  Absence of self-harm  5/17/2019 1512 by Madeline Galeana LPN  Outcome: Ongoing  [place] has been absent from self harm this shift. Patient has been  medication compliant and currently denies any thoughts of self harm. Patient is focused on wanting to play ball. Will continue to monitor for safety.

## 2019-05-17 NOTE — PLAN OF CARE
Patient with interrupted sleep this shift. He woke up at 0345 and was restless. He pulled his name tag off his door and sat down at dining table. When asked if he was okay he stated \" just doing my paperwork\". He then paced around some , was in and out of his room and was asked if he wanted medications for anxiety and to go back to sleep. He was irritable and stated \" I don't need to go back to sleep\". Later though he did come out of his room and asked for medication. He was given ativan 2 mg oral and benadryl 50 mg oral for anxiety. This was effective and patient went back to sleep. He refused his morning FSBS, pulled his arm away and back under the blanket.

## 2019-05-18 PROCEDURE — 1240000000 HC EMOTIONAL WELLNESS R&B

## 2019-05-18 PROCEDURE — 6370000000 HC RX 637 (ALT 250 FOR IP): Performed by: PSYCHIATRY & NEUROLOGY

## 2019-05-18 PROCEDURE — 99232 SBSQ HOSP IP/OBS MODERATE 35: CPT | Performed by: PSYCHIATRY & NEUROLOGY

## 2019-05-18 RX ADMIN — METFORMIN HYDROCHLORIDE 500 MG: 500 TABLET ORAL at 19:58

## 2019-05-18 RX ADMIN — RISPERIDONE 2 MG: 2 TABLET ORAL at 19:58

## 2019-05-18 RX ADMIN — METFORMIN HYDROCHLORIDE 500 MG: 500 TABLET ORAL at 09:39

## 2019-05-18 RX ADMIN — RISPERIDONE 2 MG: 2 TABLET ORAL at 09:39

## 2019-05-18 NOTE — PLAN OF CARE
Pt has been in and out of room much of this shift. Denies SI/HI, appears to be responding to internal stimuli, continuing to talk and yell to self at times. Responses to questions during conversation are not always appropriate, for example this RN asked if he would like the remote control for the tv, he responded \"whatever my hands do\". Pt also inquired as to whether he will \"be cremated or buried after he walks the plank\" and is wondering when he will be receiving his \"medieval gutting\"? Pt is able to verbalize needs to staff appropriately much of the time. Did take scheduled am meds with encouragement after asking if the were \"gonna make me shit and puke\"? Has been watching television throughout this shift. Denies needs at this time.

## 2019-05-18 NOTE — PROGRESS NOTES
Department of Psychiatry  Attending Progress Note  Chief Complaint: psychosis  Patient's chart was reviewed. Discussed with nursing staff. On interview patient reports that his mood is fine but he is flat and guarded. His thoughts are disorganized and bizarre. For example he indicates that he was admitted as the hospital because \"it all started when I cracked my foot on a conch shell in the ocean. \" He also talks about cracking his knuckles and this sending vibrations through his spine. He appears internally preoccupied. SUBJECTIVE:    Patient is feeling unchanged. Suicidal ideation:  denies suicidal ideation. Patient does not have medication side effects. ROS: Patient has new complaints: no  Sleeping adequately:  Yes   Appetite adequate: Yes  Attending groups: No:   Visitors:No    OBJECTIVE    Physical  VITALS:  /72   Pulse 60   Temp 98.1 °F (36.7 °C) (Oral)   Resp 16   Wt 217 lb (98.4 kg)   BMI 32.99 kg/m²     Mental Status Examination:  Patients appearance was ill-appearing. Thoughts are Illogical. Homicidal ideations none. No abnormal movements, tics or mannerisms. Memory impaired Aims 0. Concentration Fair. Alert and oriented X 4. Insight and Judgement impaired insight. Patient was uncooperative.  Patient gait normal. Mood constricted, affect flat affect Hallucinations Absent, suicidal ideations no specific plan to harm self Speech soft  Data  Labs:   Admission on 05/08/2019   Component Date Value Ref Range Status    POC Glucose 05/08/2019 110* 70 - 99 mg/dl Final    Performed on 05/08/2019 ACCU-CHEK   Final    POC Glucose 05/12/2019 122* 70 - 99 mg/dl Final    Performed on 05/12/2019 ACCU-CHEK   Final    POC Glucose 05/13/2019 142* 70 - 99 mg/dl Final    Performed on 05/13/2019 ACCU-CHEK   Final    POC Glucose 05/16/2019 119* 70 - 99 mg/dl Final    Performed on 05/16/2019 ACCU-CHEK   Final            Medications  Current Facility-Administered Medications: risperiDONE microspheres (RISPERDAL CONSTA) injection 25 mg, 25 mg, Intramuscular, Q14 Days  risperiDONE (RISPERDAL) tablet 2 mg, 2 mg, Oral, BID  haloperidol lactate (HALDOL) injection 10 mg, 10 mg, Intramuscular, Q6H PRN  haloperidol (HALDOL) tablet 10 mg, 10 mg, Oral, Q6H PRN  LORazepam (ATIVAN) tablet 2 mg, 2 mg, Oral, Q6H PRN **OR** LORazepam (ATIVAN) injection 2 mg, 2 mg, Intramuscular, Q6H PRN  diphenhydrAMINE (BENADRYL) injection 50 mg, 50 mg, Intramuscular, Q6H PRN **OR** diphenhydrAMINE (BENADRYL) tablet 50 mg, 50 mg, Oral, Q6H PRN  metFORMIN (GLUCOPHAGE) tablet 500 mg, 500 mg, Oral, BID WC  acetaminophen (TYLENOL) tablet 650 mg, 650 mg, Oral, Q4H PRN  ibuprofen (ADVIL;MOTRIN) tablet 400 mg, 400 mg, Oral, Q6H PRN  hydrOXYzine (VISTARIL) capsule 50 mg, 50 mg, Oral, TID PRN  magnesium hydroxide (MILK OF MAGNESIA) 400 MG/5ML suspension 30 mL, 30 mL, Oral, Daily PRN  aluminum & magnesium hydroxide-simethicone (MAALOX) 200-200-20 MG/5ML suspension 30 mL, 30 mL, Oral, Q6H PRN  glucose (GLUTOSE) 40 % oral gel 15 g, 15 g, Oral, PRN  dextrose 50 % solution 12.5 g, 12.5 g, Intravenous, PRN  glucagon (rDNA) injection 1 mg, 1 mg, Intramuscular, PRN  dextrose 5 % solution, 100 mL/hr, Intravenous, PRN  traZODone (DESYREL) tablet 50 mg, 50 mg, Oral, Nightly PRN    ASSESSMENT AND PLAN    Principal Problem:    Schizophrenia ([facility] [address]  Active Problems:    Autism spectrum disorder    Type II diabetes mellitus, well controlled ([facility] [address]  Resolved Problems:    * No resolved hospital problems. *       1. Patient s symptoms   show no change, Had Risperdal Consta 25 mg 5/15. plus risperdal 3 mg BID which was increased On Friday. 2.Probable discharge is next week  3. Discharge planning is incomplete  4. Suicidal ideation is better  5. Total time with patient was 40 minutes and more than 50 % of that time was spent counseling the patient on their symptoms, treatment and expected goals.

## 2019-05-19 PROCEDURE — 1240000000 HC EMOTIONAL WELLNESS R&B

## 2019-05-19 PROCEDURE — 6370000000 HC RX 637 (ALT 250 FOR IP): Performed by: PSYCHIATRY & NEUROLOGY

## 2019-05-19 RX ADMIN — METFORMIN HYDROCHLORIDE 500 MG: 500 TABLET ORAL at 17:31

## 2019-05-19 RX ADMIN — METFORMIN HYDROCHLORIDE 500 MG: 500 TABLET ORAL at 10:01

## 2019-05-19 RX ADMIN — RISPERIDONE 2 MG: 2 TABLET ORAL at 10:01

## 2019-05-19 RX ADMIN — RISPERIDONE 2 MG: 2 TABLET ORAL at 20:29

## 2019-05-19 NOTE — BH NOTE
Per charge nurse pt is not appropriate to attend groups. Pt did not attend group at 1:15.      Parveen Zamarripa MSW,LSW

## 2019-05-19 NOTE — PLAN OF CARE
Patient out to the team station at 19:40 & demanded his 21:00 risperdal & his glucophage, which patient had refuse earlier. Patient received both medications & a evening snack, which patient ate. Patient was agitated & declined 1:1. \"I'm tired of the silly ass questions. Just give me my fucking medications & leave me alone. \" Patient returned to his room. Velma Sanchez RN.

## 2019-05-20 PROCEDURE — 6370000000 HC RX 637 (ALT 250 FOR IP): Performed by: PSYCHIATRY & NEUROLOGY

## 2019-05-20 PROCEDURE — 1240000000 HC EMOTIONAL WELLNESS R&B

## 2019-05-20 PROCEDURE — 99233 SBSQ HOSP IP/OBS HIGH 50: CPT | Performed by: PSYCHIATRY & NEUROLOGY

## 2019-05-20 RX ADMIN — LORAZEPAM 2 MG: 2 TABLET ORAL at 03:09

## 2019-05-20 RX ADMIN — METFORMIN HYDROCHLORIDE 500 MG: 500 TABLET ORAL at 17:16

## 2019-05-20 RX ADMIN — RISPERIDONE 3 MG: 2 TABLET ORAL at 20:19

## 2019-05-20 RX ADMIN — HALOPERIDOL 10 MG: 10 TABLET ORAL at 03:09

## 2019-05-20 RX ADMIN — TRAZODONE HYDROCHLORIDE 50 MG: 50 TABLET ORAL at 01:30

## 2019-05-20 RX ADMIN — DIPHENHYDRAMINE HCL 50 MG: 25 TABLET ORAL at 03:09

## 2019-05-20 NOTE — PROGRESS NOTES
Patient has been awake since 00:00. Patient came out of the room & reach into the dayroom's garbage can & pulled out a cup. Patient was directed to put it back, because it was dirty. Patient went back to his room with the cup. Writer took him a clean cup & patient said the cup that he got was dirty & put it back into the garbage. 15 Minutes later patient came out of his room & stated that he needed to leave & he was told he needed to do some things. Patient was instructed that he could not leave tonight. \"No I don't mean now. \" Patient declined offer of medication. At 01:30 patient asked for something to sleep & was given trazodone 50 mg po.  Bridger Hennessy R.N.

## 2019-05-20 NOTE — PROGRESS NOTES
Department of Psychiatry  Attending Progress Note  Chief Complaint: psychosis  Tang Mueller is disorganized. He remains focused on women and his ability to reproduce. He has difficulty following discussion and his thoughts are random. He has shown little to no improvement  Patient's chart was reviewed and collaborated with  about the treatment plan. SUBJECTIVE:    Patient is feeling unchanged. Suicidal ideation:  denies suicidal ideation. Patient does not have medication side effects. ROS: Patient has new complaints: no  Sleeping adequately:  Yes   Appetite adequate: Yes  Attending groups:   Visitors:No    OBJECTIVE    Physical  VITALS:  BP (!) 99/52   Pulse 50   Temp 98 °F (36.7 °C) (Oral)   Resp 16   Wt 217 lb (98.4 kg)   BMI 32.99 kg/m²     Mental Status Examination:  Patients appearance was ill-appearing. Thoughts are Illogical. Homicidal ideations none. No abnormal movements, tics or mannerisms. Memory impaired Aims 0. Concentration Poor. Alert and oriented X 4. Insight and Judgement impaired insight. Patient was uncooperative.  Patient gait normal. Mood decreased range, affect flat affect Hallucinations Absent, suicidal ideations no specific plan to harm self Speech soft  Data  Labs:   Admission on 05/08/2019   Component Date Value Ref Range Status    POC Glucose 05/08/2019 110* 70 - 99 mg/dl Final    Performed on 05/08/2019 ACCU-CHEK   Final    POC Glucose 05/12/2019 122* 70 - 99 mg/dl Final    Performed on 05/12/2019 ACCU-CHEK   Final    POC Glucose 05/13/2019 142* 70 - 99 mg/dl Final    Performed on 05/13/2019 ACCU-CHEK   Final    POC Glucose 05/16/2019 119* 70 - 99 mg/dl Final    Performed on 05/16/2019 ACCU-CHEK   Final            Medications  Current Facility-Administered Medications: risperiDONE (RISPERDAL) tablet 3 mg, 3 mg, Oral, BID  risperiDONE microspheres (RISPERDAL CONSTA) injection 25 mg, 25 mg, Intramuscular, Q14 Days  haloperidol lactate (HALDOL) injection 10 mg, 10 mg, Intramuscular, Q6H PRN  haloperidol (HALDOL) tablet 10 mg, 10 mg, Oral, Q6H PRN  LORazepam (ATIVAN) tablet 2 mg, 2 mg, Oral, Q6H PRN **OR** LORazepam (ATIVAN) injection 2 mg, 2 mg, Intramuscular, Q6H PRN  diphenhydrAMINE (BENADRYL) injection 50 mg, 50 mg, Intramuscular, Q6H PRN **OR** diphenhydrAMINE (BENADRYL) tablet 50 mg, 50 mg, Oral, Q6H PRN  metFORMIN (GLUCOPHAGE) tablet 500 mg, 500 mg, Oral, BID WC  acetaminophen (TYLENOL) tablet 650 mg, 650 mg, Oral, Q4H PRN  ibuprofen (ADVIL;MOTRIN) tablet 400 mg, 400 mg, Oral, Q6H PRN  hydrOXYzine (VISTARIL) capsule 50 mg, 50 mg, Oral, TID PRN  magnesium hydroxide (MILK OF MAGNESIA) 400 MG/5ML suspension 30 mL, 30 mL, Oral, Daily PRN  aluminum & magnesium hydroxide-simethicone (MAALOX) 200-200-20 MG/5ML suspension 30 mL, 30 mL, Oral, Q6H PRN  glucose (GLUTOSE) 40 % oral gel 15 g, 15 g, Oral, PRN  dextrose 50 % solution 12.5 g, 12.5 g, Intravenous, PRN  glucagon (rDNA) injection 1 mg, 1 mg, Intramuscular, PRN  dextrose 5 % solution, 100 mL/hr, Intravenous, PRN  traZODone (DESYREL) tablet 50 mg, 50 mg, Oral, Nightly PRN    ASSESSMENT AND PLAN    Principal Problem:    Schizophrenia ([facility] [address]  Active Problems:    Autism spectrum disorder    Type II diabetes mellitus, well controlled ([facility] [address]  Resolved Problems:    * No resolved hospital problems. *       1. Patient s symptoms   show no change. Increase Risperdal 3 mg BID with his Risperdal Consta 25 mg  2. Probable discharge is next week  3. Discharge planning is incomplete  4. Suicidal ideation is better  5. Total time with patient was 40 minutes and more than 50 % of that time was spent counseling the patient on their symptoms, treatment and expected goals.

## 2019-05-20 NOTE — PLAN OF CARE
Problem: Altered Mood, Psychotic Behavior:  Goal: Ability to interact with others will improve  Description  Ability to interact with others will improve  Outcome: Ongoing   Chester Sutton has been isolative to room and self most of shift. Patient reported feeling sleepy this morning. Chester Sutton has been irritable and vague with answering assessment questions. Will continue to monitor for safety.

## 2019-05-20 NOTE — PLAN OF CARE
Patient continued  with loose disorganized thoughts. Patient was regressed to his bed mostly. Patient was observed talking on the phone & reported that he was down to the last 2 females & that he was concerned about his reproduction. \"I almost 36. \" Patient then stated that his mom was supposed to samantha him a new mattress. Patient was medication compliant with his 21:00 scheduled medications & cam to the team station & asked for them.  Odilon Hennessy R.N.

## 2019-05-21 PROCEDURE — 99233 SBSQ HOSP IP/OBS HIGH 50: CPT | Performed by: PSYCHIATRY & NEUROLOGY

## 2019-05-21 PROCEDURE — 97535 SELF CARE MNGMENT TRAINING: CPT

## 2019-05-21 PROCEDURE — 1240000000 HC EMOTIONAL WELLNESS R&B

## 2019-05-21 PROCEDURE — 6370000000 HC RX 637 (ALT 250 FOR IP): Performed by: PSYCHIATRY & NEUROLOGY

## 2019-05-21 PROCEDURE — 97165 OT EVAL LOW COMPLEX 30 MIN: CPT

## 2019-05-21 RX ORDER — LOXAPINE SUCCINATE 5 MG/1
5 TABLET ORAL 2 TIMES DAILY
Status: DISCONTINUED | OUTPATIENT
Start: 2019-05-21 | End: 2019-05-21

## 2019-05-21 RX ORDER — LOXAPINE SUCCINATE 25 MG/1
25 TABLET ORAL 2 TIMES DAILY
Status: DISCONTINUED | OUTPATIENT
Start: 2019-05-21 | End: 2019-05-24

## 2019-05-21 RX ORDER — LOXAPINE SUCCINATE 25 MG/1
25 TABLET ORAL 2 TIMES DAILY
Status: DISCONTINUED | OUTPATIENT
Start: 2019-05-21 | End: 2019-05-21

## 2019-05-21 RX ADMIN — METFORMIN HYDROCHLORIDE 500 MG: 500 TABLET ORAL at 17:38

## 2019-05-21 RX ADMIN — LOXAPINE 25 MG: 25 CAPSULE ORAL at 19:55

## 2019-05-21 RX ADMIN — METFORMIN HYDROCHLORIDE 500 MG: 500 TABLET ORAL at 09:26

## 2019-05-21 RX ADMIN — RISPERIDONE 3 MG: 2 TABLET ORAL at 09:26

## 2019-05-21 NOTE — PROGRESS NOTES
Inpatient Occupational Therapy  Evaluation and Treatment    Unit:  [facility]  Date:  5/21/2019  Patient Name:    Elva Diggs  Admitting diagnosis:  Schizophrenia [facility]) [F20.9]  Admit Date:  5/8/2019  Precautions/Restrictions/WB Status/ Lines/ Wounds/ Oxygen:  Up as tolerated  Treatment Time:  11:26-11:58  Treatment Number:  1    Patient Goals for Therapy:  Pt. Unable to state secondary to decreased cognition. Discharge Recommendations:  Home with 24/7  Assist/supervision     DME needs for discharge:   None     AM-PAC Score: 18     Home Health S4 Level: ? NA   ? Level 1- Standard  ? Level 2- Social  ? Level 3- Safety  ? Level 4- Sick    ACLS:  Mode 4.4   Engaging Abilities and Following Safety Precautions When the Person Can Complete a Goal     DESCRIPTION:     34% Cognitive Assistance: The person may live with someone who does a daily check on the environment, removing any safety hazards and solving problems when minor changes in the home occur. May be alone for part of the day with a procedure for obtaining help by phone or from a neighbor. May have a daily allowance and go to familiar places in the neighborhood. 34% minimum cognitive assistance is required to set up new activities and clean up after routine activities. 8% Physical Assistance is needed to assist with fine motor activities. Preadmission Environment:    Pt. Lives   ? alone    Home environment:   Apartment     Steps to second floor   Full Flight of 13    Preadmission Status / PLOF:  History of falls   No  Pt. Able to drive   No   Pt Fully independent for ADLs/IADLs. Yes    Pt. Required assistance from family for:  Transportation; mother provides transportation. Pt. Fully independent for transfers and gait and walked with: No Device   Sleep Hygiene:    Income:  SSDI  Financial Management:  Pt. Reports independence to manage. Leisure Interests:  Smoke cigarettes and drink coffee, cards   Medication Management:   \"An annoying clinic helps me above.    Strengths for achieving goals include:  PLOF  Barriers to achieving goals include:  Decreased cognition. Plan: To be seen 2-5x/week while in acute care setting for therapeutic exercises/act, ADL retraining, NMR and patient/caregiver ed/instruction.      Timed Code Treatment Minutes:    22 minutes    Total Treatment Time:    32  minutes    Signature and License Number      Harman Kelly OTR/L  #[id number]      If patient discharges from this facility prior to next visit, this note will serve as the Discharge Summary

## 2019-05-21 NOTE — PLAN OF CARE
Told his mom on the phone & then again, this writer, that he is \"owned\" by us, here, but that he lives in [place]. Asked to explain, but he walked away.

## 2019-05-21 NOTE — PLAN OF CARE
Patient denies SI/HI/A/V/H. Patient seclusive to his bed and room at the beginning of the shift. Patient has been visible on the milieu for short periods at a time. Patient stated; \"He was playing baseball. \" \"He needs to get out of here tomorrow to play football. \" This writer informed patient wasn't football season. \"Patient stated; \"The fuck if it isn't. \" want back to his room. \" Patient in his room at present. Patient took HS medications. No c/o's voiced at present. Breath sounds clear and equal bilaterally.

## 2019-05-21 NOTE — PROGRESS NOTES
Department of Psychiatry  Attending Progress Note  Chief Complaint: psychosis  George Flynn continues to act in a bizarre fashion. Today he talked about the 2 wives, Lisa Solo and Radha Turner and how he doesn't know where they are. He stated that they have children but not with him. He stated that he hasn't had sex with them but just talks with them, but then went on to talk about their physical characteristics, such as height, hair color, age and that he preferred them because they \"had a Ccup. \"  Risperdal doesn't seem to be effective and we discussed trying another antipsychotic. Patient's chart was reviewed and collaborated with  about the treatment plan. SUBJECTIVE:    Patient is feeling unchanged. Suicidal ideation:  denies suicidal ideation. Patient does not have medication side effects. ROS: Patient has new complaints: no  Sleeping adequately:  Yes   Appetite adequate: Yes  Attending groups: No:   Visitors:No    OBJECTIVE    Physical  VITALS:  /71   Pulse 53   Temp 97.7 °F (36.5 °C) (Oral)   Resp 16   Wt 217 lb (98.4 kg)   BMI 32.99 kg/m²     Mental Status Examination:  Patients appearance was ill-appearing. Thoughts are Illogical. Homicidal ideations none. No abnormal movements, tics or mannerisms. Memory intact Aims 0. Concentration Fair. Alert and oriented X 4. Insight and Judgement impaired insight. Patient was cooperative.  Patient gait normal. Mood constricted, affect flat affect Hallucinations Absent, suicidal ideations no specific plan to harm self Speech soft  Data  Labs:   Admission on 05/08/2019   Component Date Value Ref Range Status    POC Glucose 05/08/2019 110* 70 - 99 mg/dl Final    Performed on 05/08/2019 ACCU-CHEK   Final    POC Glucose 05/12/2019 122* 70 - 99 mg/dl Final    Performed on 05/12/2019 ACCU-CHEK   Final    POC Glucose 05/13/2019 142* 70 - 99 mg/dl Final    Performed on 05/13/2019 ACCU-CHEK   Final    POC Glucose 05/16/2019 119* 70 - 99 mg/dl Final    Performed on 05/16/2019 ACCU-CHEK   Final            Medications  Current Facility-Administered Medications: loxapine (LOXITANE) capsule 25 mg, 25 mg, Oral, BID  risperiDONE microspheres (RISPERDAL CONSTA) injection 25 mg, 25 mg, Intramuscular, Q14 Days  haloperidol lactate (HALDOL) injection 10 mg, 10 mg, Intramuscular, Q6H PRN  haloperidol (HALDOL) tablet 10 mg, 10 mg, Oral, Q6H PRN  LORazepam (ATIVAN) tablet 2 mg, 2 mg, Oral, Q6H PRN **OR** LORazepam (ATIVAN) injection 2 mg, 2 mg, Intramuscular, Q6H PRN  diphenhydrAMINE (BENADRYL) injection 50 mg, 50 mg, Intramuscular, Q6H PRN **OR** diphenhydrAMINE (BENADRYL) tablet 50 mg, 50 mg, Oral, Q6H PRN  metFORMIN (GLUCOPHAGE) tablet 500 mg, 500 mg, Oral, BID WC  acetaminophen (TYLENOL) tablet 650 mg, 650 mg, Oral, Q4H PRN  ibuprofen (ADVIL;MOTRIN) tablet 400 mg, 400 mg, Oral, Q6H PRN  hydrOXYzine (VISTARIL) capsule 50 mg, 50 mg, Oral, TID PRN  magnesium hydroxide (MILK OF MAGNESIA) 400 MG/5ML suspension 30 mL, 30 mL, Oral, Daily PRN  aluminum & magnesium hydroxide-simethicone (MAALOX) 200-200-20 MG/5ML suspension 30 mL, 30 mL, Oral, Q6H PRN  glucose (GLUTOSE) 40 % oral gel 15 g, 15 g, Oral, PRN  dextrose 50 % solution 12.5 g, 12.5 g, Intravenous, PRN  glucagon (rDNA) injection 1 mg, 1 mg, Intramuscular, PRN  dextrose 5 % solution, 100 mL/hr, Intravenous, PRN  traZODone (DESYREL) tablet 50 mg, 50 mg, Oral, Nightly PRN    ASSESSMENT AND PLAN    Principal Problem:    Schizophrenia ([address]  Active Problems:    Autism spectrum disorder    Type II diabetes mellitus, well controlled ([facility] [address]  Resolved Problems:    * No resolved hospital problems. *       1. Patient s symptoms   show no change. Stop Risperdal and begin Loxitane 25 mg BId for psychosis  2. Probable discharge is next week  3. Discharge planning is incomplete  4. Suicidal ideation is better  5.  Total time with patient was 40 minutes and more than 50 % of that time was spent counseling the patient on their symptoms, treatment and expected goals.

## 2019-05-22 PROCEDURE — 1240000000 HC EMOTIONAL WELLNESS R&B

## 2019-05-22 PROCEDURE — 6370000000 HC RX 637 (ALT 250 FOR IP): Performed by: PSYCHIATRY & NEUROLOGY

## 2019-05-22 PROCEDURE — 99233 SBSQ HOSP IP/OBS HIGH 50: CPT | Performed by: PSYCHIATRY & NEUROLOGY

## 2019-05-22 PROCEDURE — 97535 SELF CARE MNGMENT TRAINING: CPT

## 2019-05-22 RX ADMIN — LOXAPINE 25 MG: 25 CAPSULE ORAL at 20:27

## 2019-05-22 RX ADMIN — METFORMIN HYDROCHLORIDE 500 MG: 500 TABLET ORAL at 08:10

## 2019-05-22 RX ADMIN — LOXAPINE 25 MG: 25 CAPSULE ORAL at 08:10

## 2019-05-22 RX ADMIN — METFORMIN HYDROCHLORIDE 500 MG: 500 TABLET ORAL at 18:12

## 2019-05-22 RX ADMIN — TRAZODONE HYDROCHLORIDE 50 MG: 50 TABLET ORAL at 23:28

## 2019-05-22 NOTE — BH NOTE
Per charge nurse  Pt is not appropriate to attend groups. Pt did not attend group at 1:15.      Veda Wilson MSW,LSW

## 2019-05-22 NOTE — GROUP NOTE
Group Therapy Note    Date: May 21    Group Start Time: 2015  Group End Time: 2100  Group Topic: Wrap-Up    [facility]        Group Therapy Note    Attendees:  goals and the importance of goal setting discussed. Night time milieu activities discussed.          Patient's Goal:  Get to the big side    Notes:  \"had to talk to the people with computers for faces to get to the big side\"    Status After Intervention:  Improved    Participation Level: Minimal    Participation Quality: Lethargic      Speech:  slurred      Thought Process/Content: Flight of ideas      Affective Functioning: Flat      Mood: depressed      Level of consciousness:  Attentive      Response to Learning: Progressing to goal      Endings: None Reported    Modes of Intervention: Support      Discipline Responsible: [address] [employer]      Signature:  Tino Macias

## 2019-05-22 NOTE — PLAN OF CARE
Patient rates Depression 5/10 and Anxiety 8/10. Patient denies SI/HI/A/V/H. Patient visible on the milieu. Patient attended and participated in wrap up group. Patient took HS medications. No c/o's voiced at present.

## 2019-05-22 NOTE — PROGRESS NOTES
Department of Psychiatry  Attending Progress Note  Chief Complaint: psychosis  Lazarus Grace went to a group yesterday. He was in bed today with his head covered. He later lowered the sheet and was smiling. He talked about his wives again and that one of them came to see him carlos a nurse's outift. He described her body again but for some reason she is now \" a B cup\" . He had drawings on his wall that looked like a sperm. He still has  randomn thoughts that are not always connected. Patient's chart was reviewed and collaborated with  about the treatment plan. SUBJECTIVE:    Patient is feeling better. Suicidal ideation:  none. Patient does not have medication side effects. ROS: Patient has new complaints: no  Sleeping adequately:  Yes   Appetite adequate: Yes  Attending groups: Yes  Visitors:No    OBJECTIVE    Physical  VITALS:  /72   Pulse 55   Temp 97.6 °F (36.4 °C) (Oral)   Resp 14   Wt 217 lb (98.4 kg)   BMI 32.99 kg/m²     Mental Status Examination:  Patients appearance was ill-appearing. Thoughts are Illogical. Homicidal ideations none. No abnormal movements, tics or mannerisms. Memory intact Aims 0. Concentration Fair. Alert and oriented X 4. Insight and Judgement impaired insight. Patient was cooperative.  Patient gait normal. Mood constricted, affect flat affect Hallucinations Absent, suicidal ideations no specific plan to harm self Speech soft  Data  Labs:   Admission on 05/08/2019   Component Date Value Ref Range Status    POC Glucose 05/08/2019 110* 70 - 99 mg/dl Final    Performed on 05/08/2019 ACCU-CHEK   Final    POC Glucose 05/12/2019 122* 70 - 99 mg/dl Final    Performed on 05/12/2019 ACCU-CHEK   Final    POC Glucose 05/13/2019 142* 70 - 99 mg/dl Final    Performed on 05/13/2019 ACCU-CHEK   Final    POC Glucose 05/16/2019 119* 70 - 99 mg/dl Final    Performed on 05/16/2019 ACCU-CHEK   Final            Medications  Current Facility-Administered Medications: loxapine

## 2019-05-22 NOTE — PROGRESS NOTES
Inpatient Occupational Therapy Treatment    Unit:  [facility]  Date:  5/22/2019  Patient Name:    Genna Padgett  Admitting diagnosis:  Schizophrenia [facility]) [F20.9]  Admit Date:  5/8/2019  Precautions/Restrictions/WB Status/ Lines/ Wounds/ Oxygen:  Up as tolerated  Treatment Time:  11:29-11:58  Treatment Number:  2    Patient Goals for Therapy:  Pt. Unable to state secondary to decreased cognition. Discharge Recommendations:  Home with 24/7  Assist/supervision     DME needs for discharge:   None     AM-PAC Score: 18     Home Health S4 Level: ? NA   ? Level 1- Standard  ? Level 2- Social  ? Level 3- Safety  ? Level 4- Sick    ACLS:  Completed 5/21/2019  Mode 4.4   Engaging Abilities and Following Safety Precautions When the Person Can Complete a Goal     DESCRIPTION:     34% Cognitive Assistance: The person may live with someone who does a daily check on the environment, removing any safety hazards and solving problems when minor changes in the home occur. May be alone for part of the day with a procedure for obtaining help by phone or from a neighbor. May have a daily allowance and go to familiar places in the neighborhood. 34% minimum cognitive assistance is required to set up new activities and clean up after routine activities. 8% Physical Assistance is needed to assist with fine motor activities. Pain  No  Rating:  Location:  Pain Medicine Status: ? Denies need  ? Pain med requested  ? RN notified. Cognition    A&Ox person, place, month/year. Pt. Disoriented to situation. Patient appropriate and cooperative. Follows 1  To 2 step commands.     Balance  Static Sitting:  Good   Dynamic Sitting:  Good   Static Standing: Good   Dynamic Standing: Good    Bed mobility:  Independent  Supine to sit:  Sit to supine:  Scooting to head of bed:  Scooting in sitting:  Rolling:  Bridging:    Transfers:  Independent  Sit to stand:  Stand to sit:  Bed/Chair:    Self Care:  Interest Checklist:    42 likes  12 ok  25 don't likes    Exercise / Activities Initiated:   Pt. Educated on role of OT. Pt. Participated in:  ADL retraining, interest checklist.    Assessment of Deficits:   Pt participated in completing interest checklist.  Pt. Was cooperative however pt. Expressed random thoughts not connected to subject matter ie. .. \"Since my mom is a respiratory therapist I can't stop smoking. \"  \"I spend my money on cigareets. \"  Pt. Appeared to follow instruction on how to complete interest checklist.  Pt. initially went through checking boxes however discovered pt was checking boxes randomly. Pt. Stated \"I\"m using a blue crayon so I'm putting marks down for you to discover what they mean. \"  Pt. Reoriented to task, with therapist checking appropriate boxes that matched his responses. Pt. Alphclay Mcclellanque to reminisce on healthy activities that he enjoys participating in. Pt. Met one goal.    Pt. Limited during tx session by decreased cognition. At end of tx session, pt. In room. Goal(s) : To be met in 3 Visits:  1). Social Skills:  Pt. To verbalize how anger is expressed. To be met in 5 Visits:  1). Interest Checklist:  Pt. To complete interest checklist.  (Goal Met 5/22/2019)  2). Pt. To complete ADM. 3). Safety: Pt. To verbalize required steps of emergency assistance with min verbal prompting. Plan:  Continue OT per POC.      Timed Code Treatment Minutes:  29  minutes    Total Treatment Time:  29 minutes    Signature and License Number      MEE Amador/L  #[id number]      If patient discharges from this facility prior to next visit, this note will serve as the Discharge Summary

## 2019-05-23 LAB
GLUCOSE BLD-MCNC: 147 MG/DL (ref 70–99)
PERFORMED ON: ABNORMAL

## 2019-05-23 PROCEDURE — 99233 SBSQ HOSP IP/OBS HIGH 50: CPT | Performed by: PSYCHIATRY & NEUROLOGY

## 2019-05-23 PROCEDURE — 6370000000 HC RX 637 (ALT 250 FOR IP): Performed by: PSYCHIATRY & NEUROLOGY

## 2019-05-23 PROCEDURE — 1240000000 HC EMOTIONAL WELLNESS R&B

## 2019-05-23 RX ADMIN — METFORMIN HYDROCHLORIDE 500 MG: 500 TABLET ORAL at 17:41

## 2019-05-23 RX ADMIN — LOXAPINE 25 MG: 25 CAPSULE ORAL at 08:43

## 2019-05-23 RX ADMIN — METFORMIN HYDROCHLORIDE 500 MG: 500 TABLET ORAL at 08:43

## 2019-05-23 RX ADMIN — LOXAPINE 25 MG: 25 CAPSULE ORAL at 20:06

## 2019-05-23 NOTE — PLAN OF CARE
Problem: Altered Mood, Psychotic Behavior:  Goal: Ability to interact with others will improve  Description  Ability to interact with others will improve  Note:   Claudia Nieto has been attending groups today and remaining appropriate. Although visible on unit, he keeps to himself. Medication compliant. Continues to have odd facial expressions and behaviors as he continues to appear preoccupied.

## 2019-05-23 NOTE — PLAN OF CARE
Patient was regress to his room & bed, most of the shift. When writer had brief conversations with patient, he appeared more cooperative & able to converse with writer, than 2 nights ago. Patient continued with loose thoughts,\"I came here to get better, because I was blasting too many nuts. \" Patient also stated that he was trying to make babies. Lety Sherman are not giving me the plastic pill. \" Patient has been medication compliant.  Reyes Gallant Jump,R.N.

## 2019-05-23 NOTE — GROUP NOTE
Group Therapy Note    Date: May 23    Group Start Time: 1000  Group End Time: [address] [facility]  Group Topic: Psychoeducation    [address], [place]        Group Therapy Note    Attendees: 10    Patient's Goal: To achieve a relaxed state, through aroma therapy and progressive muscle relaxation exercise. Notes: Pt engaged in group activity. Pt was able to achieve a relaxed state. Pt was given resources to materials used in group session. Status After Intervention:  Improved    Participation Level:  Active Listener and Interactive    Participation Quality: Appropriate, Attentive and Sharing      Speech:  normal      Thought Process/Content: Logical      Affective Functioning: Congruent      Mood: depressed      Level of consciousness:  Alert and Oriented x4      Response to Learning: Able to retain information and Capable of insight      Endings: None Reported    Modes of Intervention: Education, Support, Socialization and Activity      Discipline Responsible: Psychoeducational Specialist      Signature:  Giselle Caraballo MA, CTRS

## 2019-05-23 NOTE — FLOWSHEET NOTE
Group Therapy Note    Date: 5/23/2019  Start Time: 1330  End Time:  1430  Number of Participants: 9    Type of Group: Music Group    Notes:  Pt present for most of Music Group. While present, Pt actively participated, making song selections and listening to music. Pt left after first 45 minutes and did not return. Participation Level:  Active Listener and Interactive    Participation Quality: Appropriate and Attentive      Speech:  normal      Affective Functioning: Blunted      Endings: None Reported    Modes of Intervention: Support, Socialization, Activity and Media      Discipline Responsible: Chaplain Kvng Freitas       05/23/19 9551   Encounter Summary   Services provided to: Patient   Continue Visiting   (5/23 Music Group)   Complexity of Encounter Moderate   Length of Encounter 45 minutes

## 2019-05-23 NOTE — GROUP NOTE
Group Therapy Note    Date: May 23    Group Start Time: 1430  Group End Time: 2230  Group Topic: [address] 54        Group Therapy Note    Attendees: 8         Patient's Goal:  Patient will complete worksheet on People Pleasing. Will discuss how People Pleasing leads to negative emotions and moods. Notes:  Patient attended group today. Completed worksheet and discussed. He sat quietly and listened throughout most of the group. Status After Intervention:  Improved    Participation Level:  Active Listener    Participation Quality: Appropriate and Attentive      Speech:  normal      Thought Process/Content: Logical      Affective Functioning: Congruent      Mood: anxious and depressed      Level of consciousness:  Oriented x4      Response to Learning: Able to verbalize current knowledge/experience      Endings: None Reported    Modes of Intervention: Education, Support and Socialization      Discipline Responsible: /Counselor      Signature:  Irasema Lu 54

## 2019-05-23 NOTE — PROGRESS NOTES
Department of Psychiatry  Attending Progress Note  Chief Complaint: psychosis  Sana Martin is more active and in program. He remains odd and disorganized but is now dressing for the day and is able to go to groups and the large side at times. Patient's chart was reviewed and collaborated with  about the treatment plan. SUBJECTIVE:    Patient is feeling better. Suicidal ideation:  denies suicidal ideation. Patient does not have medication side effects. ROS: Patient has new complaints: no  Sleeping adequately:  Yes   Appetite adequate: Yes  Attending groups: Yes  Visitors:No    OBJECTIVE    Physical  VITALS:  /68   Pulse 53   Temp 97.8 °F (36.6 °C) (Oral)   Resp 14   Wt 217 lb (98.4 kg)   BMI 32.99 kg/m²     Mental Status Examination:  Patients appearance was ill-appearing. Thoughts are Illogical. Homicidal ideations none. No abnormal movements, tics or mannerisms. Memory intact Aims 0. Concentration Fair. Alert and oriented X 4. Insight and Judgement impaired insight. Patient was cooperative.  Patient gait normal. Mood constricted, affect flat affect Hallucinations Absent, suicidal ideations no specific plan to harm self Speech soft  Data  Labs:   Admission on 05/08/2019   Component Date Value Ref Range Status    POC Glucose 05/08/2019 110* 70 - 99 mg/dl Final    Performed on 05/08/2019 ACCU-CHEK   Final    POC Glucose 05/12/2019 122* 70 - 99 mg/dl Final    Performed on 05/12/2019 ACCU-CHEK   Final    POC Glucose 05/13/2019 142* 70 - 99 mg/dl Final    Performed on 05/13/2019 ACCU-CHEK   Final    POC Glucose 05/16/2019 119* 70 - 99 mg/dl Final    Performed on 05/16/2019 ACCU-CHEK   Final    POC Glucose 05/23/2019 147* 70 - 99 mg/dl Final    Performed on 05/23/2019 ACCU-CHEK   Final            Medications  Current Facility-Administered Medications: loxapine (LOXITANE) capsule 25 mg, 25 mg, Oral, BID  risperiDONE microspheres (RISPERDAL CONSTA) injection 25 mg, 25 mg, Intramuscular, Q14 Days  haloperidol lactate (HALDOL) injection 10 mg, 10 mg, Intramuscular, Q6H PRN  haloperidol (HALDOL) tablet 10 mg, 10 mg, Oral, Q6H PRN  LORazepam (ATIVAN) tablet 2 mg, 2 mg, Oral, Q6H PRN **OR** LORazepam (ATIVAN) injection 2 mg, 2 mg, Intramuscular, Q6H PRN  diphenhydrAMINE (BENADRYL) injection 50 mg, 50 mg, Intramuscular, Q6H PRN **OR** diphenhydrAMINE (BENADRYL) tablet 50 mg, 50 mg, Oral, Q6H PRN  metFORMIN (GLUCOPHAGE) tablet 500 mg, 500 mg, Oral, BID WC  acetaminophen (TYLENOL) tablet 650 mg, 650 mg, Oral, Q4H PRN  ibuprofen (ADVIL;MOTRIN) tablet 400 mg, 400 mg, Oral, Q6H PRN  hydrOXYzine (VISTARIL) capsule 50 mg, 50 mg, Oral, TID PRN  magnesium hydroxide (MILK OF MAGNESIA) 400 MG/5ML suspension 30 mL, 30 mL, Oral, Daily PRN  aluminum & magnesium hydroxide-simethicone (MAALOX) 200-200-20 MG/5ML suspension 30 mL, 30 mL, Oral, Q6H PRN  glucose (GLUTOSE) 40 % oral gel 15 g, 15 g, Oral, PRN  dextrose 50 % solution 12.5 g, 12.5 g, Intravenous, PRN  glucagon (rDNA) injection 1 mg, 1 mg, Intramuscular, PRN  dextrose 5 % solution, 100 mL/hr, Intravenous, PRN  traZODone (DESYREL) tablet 50 mg, 50 mg, Oral, Nightly PRN    ASSESSMENT AND PLAN    Principal Problem:    Schizophrenia ([facility] [address]  Active Problems:    Autism spectrum disorder    Type II diabetes mellitus, well controlled ([facility] [address]  Resolved Problems:    * No resolved hospital problems. *       1. Patient s symptoms   are improving  2. Probable discharge is next week  3. Discharge planning is complete  4. Suicidal ideation is better  5. Total time with patient was 40 minutes and more than 50 % of that time was spent counseling the patient on their symptoms, treatment and expected goals.

## 2019-05-23 NOTE — PROGRESS NOTES
Patient awake & was given trazodone 50 mg po for sleep.  Patient was also given snacks per request Joesph Hennessy R.N.

## 2019-05-24 PROCEDURE — 1240000000 HC EMOTIONAL WELLNESS R&B

## 2019-05-24 PROCEDURE — 99233 SBSQ HOSP IP/OBS HIGH 50: CPT | Performed by: PSYCHIATRY & NEUROLOGY

## 2019-05-24 PROCEDURE — 6370000000 HC RX 637 (ALT 250 FOR IP): Performed by: PSYCHIATRY & NEUROLOGY

## 2019-05-24 RX ORDER — LOXAPINE SUCCINATE 25 MG/1
25 TABLET ORAL 3 TIMES DAILY
Status: DISCONTINUED | OUTPATIENT
Start: 2019-05-24 | End: 2019-05-27

## 2019-05-24 RX ADMIN — TRAZODONE HYDROCHLORIDE 50 MG: 50 TABLET ORAL at 00:03

## 2019-05-24 RX ADMIN — LOXAPINE 25 MG: 25 CAPSULE ORAL at 14:09

## 2019-05-24 RX ADMIN — METFORMIN HYDROCHLORIDE 500 MG: 500 TABLET ORAL at 17:08

## 2019-05-24 RX ADMIN — METFORMIN HYDROCHLORIDE 500 MG: 500 TABLET ORAL at 08:09

## 2019-05-24 RX ADMIN — TRAZODONE HYDROCHLORIDE 50 MG: 50 TABLET ORAL at 22:27

## 2019-05-24 RX ADMIN — LOXAPINE 25 MG: 25 CAPSULE ORAL at 21:13

## 2019-05-24 RX ADMIN — LOXAPINE 25 MG: 25 CAPSULE ORAL at 08:09

## 2019-05-24 NOTE — PROGRESS NOTES
Department of Psychiatry  Attending Progress Note  Chief Complaint: psychosis  Laila Montalvo has been attending groups . He as less animated today and flattened affect. No smiling and not as verbal toady . He has been attending groups without problems. Continue with Loxitane 25 mg bID plus Risperdal consta 25 mg   Patient's chart was reviewed and collaborated with  about the treatment plan. SUBJECTIVE:    Patient is feeling better. Suicidal ideation:  denies suicidal ideation. Patient does not have medication side effects. ROS: Patient has new complaints: no  Sleeping adequately:  Yes   Appetite adequate: Yes  Attending groups: Yes  Visitors:No    OBJECTIVE    Physical  VITALS:  BP (!) 139/97   Pulse 61   Temp 98 °F (36.7 °C) (Oral)   Resp 16   Wt 217 lb (98.4 kg)   BMI 32.99 kg/m²     Mental Status Examination:  Patients appearance was ill-appearing. Thoughts are Illogical. Homicidal ideations none. No abnormal movements, tics or mannerisms. Memory intact Aims 0. Concentration Fair. Alert and oriented X 4. Insight and Judgement impaired insight. Patient was cooperative.  Patient gait normal. Mood constricted, affect flat affect Hallucinations Absent, suicidal ideations no specific plan to harm self Speech soft  Data  Labs:   Admission on 05/08/2019   Component Date Value Ref Range Status    POC Glucose 05/08/2019 110* 70 - 99 mg/dl Final    Performed on 05/08/2019 ACCU-CHEK   Final    POC Glucose 05/12/2019 122* 70 - 99 mg/dl Final    Performed on 05/12/2019 ACCU-CHEK   Final    POC Glucose 05/13/2019 142* 70 - 99 mg/dl Final    Performed on 05/13/2019 ACCU-CHEK   Final    POC Glucose 05/16/2019 119* 70 - 99 mg/dl Final    Performed on 05/16/2019 ACCU-CHEK   Final    POC Glucose 05/23/2019 147* 70 - 99 mg/dl Final    Performed on 05/23/2019 ACCU-CHEK   Final            Medications  Current Facility-Administered Medications: loxapine (LOXITANE) capsule 25 mg, 25 mg, Oral, BID  risperiDONE microspheres (RISPERDAL CONSTA) injection 25 mg, 25 mg, Intramuscular, Q14 Days  haloperidol lactate (HALDOL) injection 10 mg, 10 mg, Intramuscular, Q6H PRN  haloperidol (HALDOL) tablet 10 mg, 10 mg, Oral, Q6H PRN  LORazepam (ATIVAN) tablet 2 mg, 2 mg, Oral, Q6H PRN **OR** LORazepam (ATIVAN) injection 2 mg, 2 mg, Intramuscular, Q6H PRN  diphenhydrAMINE (BENADRYL) injection 50 mg, 50 mg, Intramuscular, Q6H PRN **OR** diphenhydrAMINE (BENADRYL) tablet 50 mg, 50 mg, Oral, Q6H PRN  metFORMIN (GLUCOPHAGE) tablet 500 mg, 500 mg, Oral, BID WC  acetaminophen (TYLENOL) tablet 650 mg, 650 mg, Oral, Q4H PRN  ibuprofen (ADVIL;MOTRIN) tablet 400 mg, 400 mg, Oral, Q6H PRN  hydrOXYzine (VISTARIL) capsule 50 mg, 50 mg, Oral, TID PRN  magnesium hydroxide (MILK OF MAGNESIA) 400 MG/5ML suspension 30 mL, 30 mL, Oral, Daily PRN  aluminum & magnesium hydroxide-simethicone (MAALOX) 200-200-20 MG/5ML suspension 30 mL, 30 mL, Oral, Q6H PRN  glucose (GLUTOSE) 40 % oral gel 15 g, 15 g, Oral, PRN  dextrose 50 % solution 12.5 g, 12.5 g, Intravenous, PRN  glucagon (rDNA) injection 1 mg, 1 mg, Intramuscular, PRN  dextrose 5 % solution, 100 mL/hr, Intravenous, PRN  traZODone (DESYREL) tablet 50 mg, 50 mg, Oral, Nightly PRN    ASSESSMENT AND PLAN    Principal Problem:    Schizophrenia ([facility] [address]  Active Problems:    Autism spectrum disorder    Type II diabetes mellitus, well controlled ([facility] [address]  Resolved Problems:    * No resolved hospital problems. *       1. Patient s symptoms   are improving. Increase Loxitane 25 mg TID  2. Probable discharge is next week  3. Discharge planning is incomplete  4. Suicidal ideation is better  5. Total time with patient was 40 minutes and more than 50 % of that time was spent counseling the patient on their symptoms, treatment and expected goals.

## 2019-05-24 NOTE — PLAN OF CARE
Pt has been visible on unit though not social. Preoccupied/ gaze intense, unfaltering when speaking to staff. Denied SI/HI. Denied hallucinations, paranoia. Attended group though did not speak. Said that new med \"is doing what it is supposed to do \" and that he likes it. Reported that he had a good day.

## 2019-05-24 NOTE — BH NOTE
Writer invited and encouraged pt to attend group at 2:30. Pt declined, pt did not come to group.      John Salinas MSW,LSW

## 2019-05-24 NOTE — BH NOTE
Writer spoke with pt's  Maris Munoz [phone] to provide updates on pt progress. CM requested that clinical information be faxed to [address] [place] [address] triage for consideration of placement in crisis stabilization after discharge. Writer faxed clinical to Located within [facility] triage at [phone].     Deangelo Mohamud MSW, LSW

## 2019-05-24 NOTE — PLAN OF CARE
Problem: Altered Mood, Psychotic Behavior:  Goal: Able to verbalize decrease in frequency and intensity of hallucinations  5/24/2019 1001 by Mitesh Sauceda RN  Outcome: Ongoing   Pt denies SI/HI and A/V hallucinations. Pt still exhibits blocking at times, intense eye contact and tone of voice incongruent with response. Pt appears distracted and preoccupied. Pt has been medication compliant so far this shift.

## 2019-05-25 LAB
GLUCOSE BLD-MCNC: 132 MG/DL (ref 70–99)
PERFORMED ON: ABNORMAL

## 2019-05-25 PROCEDURE — 1240000000 HC EMOTIONAL WELLNESS R&B

## 2019-05-25 PROCEDURE — 99233 SBSQ HOSP IP/OBS HIGH 50: CPT | Performed by: PSYCHIATRY & NEUROLOGY

## 2019-05-25 PROCEDURE — 6370000000 HC RX 637 (ALT 250 FOR IP): Performed by: PSYCHIATRY & NEUROLOGY

## 2019-05-25 RX ADMIN — METFORMIN HYDROCHLORIDE 500 MG: 500 TABLET ORAL at 08:29

## 2019-05-25 RX ADMIN — LOXAPINE 25 MG: 25 CAPSULE ORAL at 15:36

## 2019-05-25 RX ADMIN — LOXAPINE 25 MG: 25 CAPSULE ORAL at 21:14

## 2019-05-25 RX ADMIN — METFORMIN HYDROCHLORIDE 500 MG: 500 TABLET ORAL at 17:14

## 2019-05-25 RX ADMIN — LOXAPINE 25 MG: 25 CAPSULE ORAL at 08:29

## 2019-05-25 RX ADMIN — TRAZODONE HYDROCHLORIDE 50 MG: 50 TABLET ORAL at 21:14

## 2019-05-25 NOTE — BH NOTE
Group Therapy Note    Date: 5/24/2019  Start Time: 20:00  End Time:  21:00  Number of Participants: 9    Type of Group: Recreational  Wrap up    Marty Salmeron Information  Module Name:  /  Session Number:  /    Patient's Goal:  Go to group    Notes:  /    Status After Intervention:  Improved    Participation Level:  Active Listener and Interactive    Participation Quality: Appropriate and Attentive      Speech:  pressured      Thought Process/Content: Logical      Affective Functioning: Blunted      Mood: anxious      Level of consciousness:  Alert, Oriented x4 and Attentive      Response to Learning: Progressing to goal      Endings: None Reported    Modes of Intervention: Socialization and Problem-solving      Discipline Responsible: Behavorial Health Tech      Signature:  Olamide Arora

## 2019-05-25 NOTE — BH NOTE
C/o not being able to sleep, requested something and received Trazodone 50 mg. Will monitor for efficacy.

## 2019-05-26 PROCEDURE — 6370000000 HC RX 637 (ALT 250 FOR IP): Performed by: PSYCHIATRY & NEUROLOGY

## 2019-05-26 PROCEDURE — 1240000000 HC EMOTIONAL WELLNESS R&B

## 2019-05-26 RX ADMIN — METFORMIN HYDROCHLORIDE 500 MG: 500 TABLET ORAL at 19:19

## 2019-05-26 RX ADMIN — METFORMIN HYDROCHLORIDE 500 MG: 500 TABLET ORAL at 08:05

## 2019-05-26 RX ADMIN — TRAZODONE HYDROCHLORIDE 50 MG: 50 TABLET ORAL at 20:44

## 2019-05-26 RX ADMIN — LOXAPINE 25 MG: 25 CAPSULE ORAL at 14:32

## 2019-05-26 RX ADMIN — LOXAPINE 25 MG: 25 CAPSULE ORAL at 08:05

## 2019-05-26 RX ADMIN — LOXAPINE 25 MG: 25 CAPSULE ORAL at 20:44

## 2019-05-26 ASSESSMENT — ENCOUNTER SYMPTOMS
CHEST TIGHTNESS: 0
ABDOMINAL PAIN: 0
SHORTNESS OF BREATH: 0
SORE THROAT: 0

## 2019-05-26 NOTE — PROGRESS NOTES
Department of Psychiatry  Attending Progress Note  Chief Complaint: psychosis  Kassi Ibrahim has been attending groups but sometimes leaves early and paces unit. Pt appears to be anxious about another pt walking with a malloy. Pt stated that he needs to know reasons for a catheter. I explained that he does not need one and he smiled. Pt denies se from meds. . Continue with Loxitane 25 mg bID plus Risperdal consta 25 mg     Patient's chart was reviewed and collaborated with  about the treatment plan. SUBJECTIVE:    Patient is feeling better. Suicidal ideation:  denies suicidal ideation. Patient does not have medication side effects. Review of Systems   Constitutional: Positive for activity change. Negative for appetite change. HENT: Negative for congestion and sore throat. Respiratory: Negative for chest tightness and shortness of breath. Cardiovascular: Negative for chest pain. Gastrointestinal: Negative for abdominal pain. Genitourinary: Negative for difficulty urinating. Musculoskeletal: Negative for gait problem. Neurological: Negative for dizziness and headaches. ROS: Patient has new complaints: no  Sleeping adequately:  Yes 6 hrs  Appetite adequate: Yes  Attending groups: Yes  Visitors:No    OBJECTIVE    Physical  VITALS:  /63   Pulse 99   Temp 97.6 °F (36.4 °C) (Oral)   Resp 16   Wt 217 lb (98.4 kg)   SpO2 98%   BMI 32.99 kg/m²     Mental Status Examination:  Patients appearance was ill-appearing. Thoughts are Illogical. Homicidal ideations none. No abnormal movements, tics or mannerisms. Memory intact Aims 0. Concentration Fair. Alert and oriented X 4. Insight and Judgement impaired insight. Patient was cooperative.  Patient gait normal. Mood constricted, affect flat affect Hallucinations Absent but appears distracted, suicidal ideations no specific plan to harm self Speech low production  Data  Labs:   Admission on 05/08/2019   Component Date Value Ref Range Status  POC Glucose 05/08/2019 110* 70 - 99 mg/dl Final    Performed on 05/08/2019 ACCU-CHEK   Final    POC Glucose 05/12/2019 122* 70 - 99 mg/dl Final    Performed on 05/12/2019 ACCU-CHEK   Final    POC Glucose 05/13/2019 142* 70 - 99 mg/dl Final    Performed on 05/13/2019 ACCU-CHEK   Final    POC Glucose 05/16/2019 119* 70 - 99 mg/dl Final    Performed on 05/16/2019 ACCU-CHEK   Final    POC Glucose 05/23/2019 147* 70 - 99 mg/dl Final    Performed on 05/23/2019 ACCU-CHEK   Final    POC Glucose 05/25/2019 132* 70 - 99 mg/dl Final    Performed on 05/25/2019 ACCU-CHEK   Final            Medications  Current Facility-Administered Medications: loxapine (LOXITANE) capsule 25 mg, 25 mg, Oral, TID  risperiDONE microspheres (RISPERDAL CONSTA) injection 25 mg, 25 mg, Intramuscular, Q14 Days  haloperidol lactate (HALDOL) injection 10 mg, 10 mg, Intramuscular, Q6H PRN  haloperidol (HALDOL) tablet 10 mg, 10 mg, Oral, Q6H PRN  LORazepam (ATIVAN) tablet 2 mg, 2 mg, Oral, Q6H PRN **OR** LORazepam (ATIVAN) injection 2 mg, 2 mg, Intramuscular, Q6H PRN  diphenhydrAMINE (BENADRYL) injection 50 mg, 50 mg, Intramuscular, Q6H PRN **OR** diphenhydrAMINE (BENADRYL) tablet 50 mg, 50 mg, Oral, Q6H PRN  metFORMIN (GLUCOPHAGE) tablet 500 mg, 500 mg, Oral, BID WC  acetaminophen (TYLENOL) tablet 650 mg, 650 mg, Oral, Q4H PRN  ibuprofen (ADVIL;MOTRIN) tablet 400 mg, 400 mg, Oral, Q6H PRN  hydrOXYzine (VISTARIL) capsule 50 mg, 50 mg, Oral, TID PRN  magnesium hydroxide (MILK OF MAGNESIA) 400 MG/5ML suspension 30 mL, 30 mL, Oral, Daily PRN  aluminum & magnesium hydroxide-simethicone (MAALOX) 200-200-20 MG/5ML suspension 30 mL, 30 mL, Oral, Q6H PRN  glucose (GLUTOSE) 40 % oral gel 15 g, 15 g, Oral, PRN  dextrose 50 % solution 12.5 g, 12.5 g, Intravenous, PRN  glucagon (rDNA) injection 1 mg, 1 mg, Intramuscular, PRN  dextrose 5 % solution, 100 mL/hr, Intravenous, PRN  traZODone (DESYREL) tablet 50 mg, 50 mg, Oral, Nightly PRN    ASSESSMENT AND PLAN    Principal Problem:    [facility]  Active Problems:    Autism spectrum disorder    Type II diabetes mellitus, well controlled ([facility]  Resolved Problems:    * No resolved hospital problems. *       1. Patient s symptoms   are improving. cont meds as prescribed  2. Probable discharge is next week  3. Discharge planning is incomplete  4. Suicidal ideation is unchanged  5. Total time with patient was 40 minutes and more than 50 % of that time was spent counseling the patient on their symptoms, treatment and expected goals.

## 2019-05-26 NOTE — BH NOTE
When trying to do pt.'s AM FSBS pt stated that he was trying to take a shit. Also stated he was f__n tired of being poked, given shots and meds. He refused FSBS. Then in about 15 min pt came out to the desk and asked if he was supposed to take any meds. Pt remains irritable and s/w labile.

## 2019-05-26 NOTE — GROUP NOTE
Group Therapy Note    Date: May 26    Group Start Time: 0130  Group End Time: 0230  Group Topic: Recreational    [address], [place]        Group Therapy Note    Attendees: 11         Group focused on self- esteem. Article was given on self -esteem, a self esteem journal daily sheet was given for pt's to fill out daily. Writer had pt's draw what self -esteem means to them. Notes: Pt followed along with the articles given. Pt would come in and out of group. PT did not draw what self-esteem means to him. Status After Intervention:  Unchanged    Participation Level: Active Listener    Participation Quality: Attentive      Speech:  normal      Thought Process/Content: Logical      Affective Functioning: Constricted/Restricted      Mood: depressed      Level of consciousness:  Alert      Response to Learning: Progressing to goal      Endings: None Reported    Modes of Intervention: Education, Support, Socialization, Exploration, Clarifying and Activity      Discipline Responsible: /Counselor      Signature:   PALLAVI Clarke

## 2019-05-26 NOTE — PLAN OF CARE
Problem: Injury - Risk of, Physical Injury:  Goal: Will remain free from falls  Description  Will remain free from falls  Outcome: Ongoing   Jabier Gabriel has been absent from falls this shift. Patient has been up and ambulatory on unit with proper footwear on. Patients gait is steady. Will continue to monitor for safety.

## 2019-05-26 NOTE — BH NOTE
Group Therapy Note    Date: 5/25/2019  Start Time:1630  End Time:  1700  Number of Participants: 15    Type of Group: Wrap-Up      Patient's Goal:  Had a good visit     Notes:   Pt participated in group as evidence by verbal feedback.  Pt participated in group by sharing goal and progress made toward goal.       Status After Intervention:  Unchanged    Participation Level: minimal, resistant   Speech:  hesitant      Thought Process/Content: Flight of ideas      Affective Functioning: Flat      Mood: anxious and depressed      Level of consciousness:  Alert      Response to Learning: Resistant      Endings: None Reported    Modes of Intervention: Support and Socialization      Discipline Responsible: Behavorial Health Tech      Signature:  [address]

## 2019-05-27 LAB
GLUCOSE BLD-MCNC: 127 MG/DL (ref 70–99)
PERFORMED ON: ABNORMAL

## 2019-05-27 PROCEDURE — 1240000000 HC EMOTIONAL WELLNESS R&B

## 2019-05-27 PROCEDURE — 99233 SBSQ HOSP IP/OBS HIGH 50: CPT | Performed by: PSYCHIATRY & NEUROLOGY

## 2019-05-27 PROCEDURE — 6370000000 HC RX 637 (ALT 250 FOR IP): Performed by: PSYCHIATRY & NEUROLOGY

## 2019-05-27 RX ORDER — LOXAPINE SUCCINATE 50 MG/1
50 TABLET ORAL 2 TIMES DAILY
Status: DISCONTINUED | OUTPATIENT
Start: 2019-05-27 | End: 2019-05-28 | Stop reason: HOSPADM

## 2019-05-27 RX ADMIN — LOXAPINE 25 MG: 25 CAPSULE ORAL at 09:47

## 2019-05-27 RX ADMIN — METFORMIN HYDROCHLORIDE 500 MG: 500 TABLET ORAL at 09:47

## 2019-05-27 RX ADMIN — LOXAPINE 50 MG: 50 CAPSULE ORAL at 21:12

## 2019-05-27 RX ADMIN — METFORMIN HYDROCHLORIDE 500 MG: 500 TABLET ORAL at 18:16

## 2019-05-27 NOTE — PROGRESS NOTES
Department of Psychiatry  Attending Progress Note  Chief Complaint: psychosis  Kylie Osuna is asking to leave soon. We discussed his progress although he appeared somewhat disinterested. He remains odd and with limited insight  But is more active in program.   Patient's chart was reviewed and collaborated with  about the treatment plan. SUBJECTIVE:    Patient is feeling better. Suicidal ideation:  denies suicidal ideation. Patient does not have medication side effects. ROS: Patient has new complaints: no  Sleeping adequately:  Yes   Appetite adequate: Yes  Attending groups: Yes  Visitors:No    OBJECTIVE    Physical  VITALS:  /68   Pulse 68   Temp 97.5 °F (36.4 °C) (Oral)   Resp 16   Wt 217 lb (98.4 kg)   SpO2 97%   BMI 32.99 kg/m²     Mental Status Examination:  Patients appearance was ill-appearing. Thoughts are Illogical. Homicidal ideations none. No abnormal movements, tics or mannerisms. Memory intact Aims 0. Concentration Fair. Alert and oriented X 4. Insight and Judgement delusions. Patient was cooperative.  Patient gait normal. Mood constricted, affect flat affect Hallucinations Absent, suicidal ideations no specific plan to harm self Speech normal volume  Data  Labs:   Admission on 05/08/2019   Component Date Value Ref Range Status    POC Glucose 05/08/2019 110* 70 - 99 mg/dl Final    Performed on 05/08/2019 ACCU-CHEK   Final    POC Glucose 05/12/2019 122* 70 - 99 mg/dl Final    Performed on 05/12/2019 ACCU-CHEK   Final    POC Glucose 05/13/2019 142* 70 - 99 mg/dl Final    Performed on 05/13/2019 ACCU-CHEK   Final    POC Glucose 05/16/2019 119* 70 - 99 mg/dl Final    Performed on 05/16/2019 ACCU-CHEK   Final    POC Glucose 05/23/2019 147* 70 - 99 mg/dl Final    Performed on 05/23/2019 ACCU-CHEK   Final    POC Glucose 05/25/2019 132* 70 - 99 mg/dl Final    Performed on 05/25/2019 ACCU-CHEK   Final    POC Glucose 05/27/2019 127* 70 - 99 mg/dl Final    Performed on 05/27/2019 ACCU-CHEK   Final            Medications  Current Facility-Administered Medications: loxapine (LOXITANE) capsule 50 mg, 50 mg, Oral, BID  risperiDONE microspheres (RISPERDAL CONSTA) injection 25 mg, 25 mg, Intramuscular, Q14 Days  haloperidol lactate (HALDOL) injection 10 mg, 10 mg, Intramuscular, Q6H PRN  haloperidol (HALDOL) tablet 10 mg, 10 mg, Oral, Q6H PRN  LORazepam (ATIVAN) tablet 2 mg, 2 mg, Oral, Q6H PRN **OR** LORazepam (ATIVAN) injection 2 mg, 2 mg, Intramuscular, Q6H PRN  diphenhydrAMINE (BENADRYL) injection 50 mg, 50 mg, Intramuscular, Q6H PRN **OR** diphenhydrAMINE (BENADRYL) tablet 50 mg, 50 mg, Oral, Q6H PRN  metFORMIN (GLUCOPHAGE) tablet 500 mg, 500 mg, Oral, BID WC  acetaminophen (TYLENOL) tablet 650 mg, 650 mg, Oral, Q4H PRN  ibuprofen (ADVIL;MOTRIN) tablet 400 mg, 400 mg, Oral, Q6H PRN  hydrOXYzine (VISTARIL) capsule 50 mg, 50 mg, Oral, TID PRN  magnesium hydroxide (MILK OF MAGNESIA) 400 MG/5ML suspension 30 mL, 30 mL, Oral, Daily PRN  aluminum & magnesium hydroxide-simethicone (MAALOX) 200-200-20 MG/5ML suspension 30 mL, 30 mL, Oral, Q6H PRN  glucose (GLUTOSE) 40 % oral gel 15 g, 15 g, Oral, PRN  dextrose 50 % solution 12.5 g, 12.5 g, Intravenous, PRN  glucagon (rDNA) injection 1 mg, 1 mg, Intramuscular, PRN  dextrose 5 % solution, 100 mL/hr, Intravenous, PRN  traZODone (DESYREL) tablet 50 mg, 50 mg, Oral, Nightly PRN    ASSESSMENT AND PLAN    Principal Problem:    Schizophrenia ([address]  Active Problems:    Autism spectrum disorder    Type II diabetes mellitus, well controlled ([facility] [address]  Resolved Problems:    * No resolved hospital problems. *       1. Patient s symptoms   are improving. Change Loxitane to 50 mg BID   2. Probable discharge is 1-2 days  3. Discharge planning is complete  4. Suicidal ideation is better  5. Total time with patient was 40 minutes and more than 50 % of that time was spent counseling the patient on their symptoms, treatment and expected goals.

## 2019-05-28 VITALS
WEIGHT: 217 LBS | BODY MASS INDEX: 32.99 KG/M2 | DIASTOLIC BLOOD PRESSURE: 90 MMHG | HEART RATE: 96 BPM | OXYGEN SATURATION: 97 % | TEMPERATURE: 97.6 F | RESPIRATION RATE: 16 BRPM | SYSTOLIC BLOOD PRESSURE: 128 MMHG

## 2019-05-28 PROCEDURE — 99239 HOSP IP/OBS DSCHRG MGMT >30: CPT | Performed by: PSYCHIATRY & NEUROLOGY

## 2019-05-28 PROCEDURE — 5130000000 HC BRIDGE APPOINTMENT

## 2019-05-28 PROCEDURE — 6360000002 HC RX W HCPCS: Performed by: PSYCHIATRY & NEUROLOGY

## 2019-05-28 PROCEDURE — 6370000000 HC RX 637 (ALT 250 FOR IP): Performed by: PSYCHIATRY & NEUROLOGY

## 2019-05-28 RX ORDER — LOXAPINE SUCCINATE 50 MG/1
50 TABLET ORAL 2 TIMES DAILY
Qty: 60 CAPSULE | Refills: 0 | Status: SHIPPED | OUTPATIENT
Start: 2019-05-28

## 2019-05-28 RX ADMIN — METFORMIN HYDROCHLORIDE 500 MG: 500 TABLET ORAL at 08:32

## 2019-05-28 RX ADMIN — LOXAPINE 50 MG: 50 CAPSULE ORAL at 08:32

## 2019-05-28 RX ADMIN — RISPERIDONE 25 MG: KIT at 13:52

## 2019-05-28 NOTE — BH NOTE
Writer attempted to reach pt's , Ned Oconnell [phone], to inform him of pt's discharge and to coordinate follow up services. A voicemail was left for a return call.     Khurram Mendoza MSW, LSW

## 2019-05-28 NOTE — BH NOTE
[facility]  Discharge Note    Pt discharged with followings belongings:   Dentures: None  Vision - Corrective Lenses: None  Hearing Aid: None  Jewelry: None  Body Piercings Removed: N/A  Clothing: Socks, Pants, Shirt  Were All Patient Medications Collected?: Not Applicable  Other Valuables: Cell phone   Valuables sent home with patient. Patient left department with Departure Mode: Other (Comment)() via Mobility at Departure: Ambulatory, discharged to Discharged to: Private Residence. Patient education on aftercare instructions: follow up and medications. Patient verbalize understanding of AVS:  yes. Status EXAM upon discharge:  Status and Exam  Normal: No  Facial Expression: Flat  Affect: Blunt  Level of Consciousness: Alert  Mood:Normal: No  Mood: Irritable  Motor Activity:Normal: Yes  Motor Activity: Decreased  Interview Behavior: Irritable, Cooperative  Preception: (Patient declined to answer)  Attention:Normal: No  Attention: Distractible  Thought Processes: Blocking, Loose Assoc. ( improving)  Thought Content:Normal: No  Thought Content: Preoccupations( improving)  Hallucinations: None  Delusions: No  Delusions: Obsessions  Memory:Normal: No  Memory: (patient declined to talk)( improved)  Insight and Judgment: No  Insight and Judgment: Poor Judgment, Poor Insight(improved)  Present Suicidal Ideation: No  Present Homicidal Ideation: No      Metabolic Screening:    Lab Results   Component Value Date    LABA1C 5.5 03/14/2011       Lab Results   Component Value Date    CHOL 102 03/14/2011     Lab Results   Component Value Date    TRIG 113 03/14/2011     Lab Results   Component Value Date    HDL 21 (L) 03/14/2011     No components found for: [facility] EVALUATION AND TREATMENT [place]  Lab Results   Component Value Date    LABVLDL 23 03/14/2011         Bridge Appointment completed: Reviewed Discharge Instructions with patient.     Patient verbalizes understanding and agreement with the discharge plan using the teachback method.      Referral for Outpatient Tobacco Cessation Counseling, upon discharge (hernandez X if applicable and completed):    ( )  Hospital staff assisted patient to call Quit Line or faxed referral                                   during hospitalization                  ( )  Recognizing danger situations (included triggers and roadblocks), if not completed on admission                    ( )  Coping skills (new ways to manage stress, exercise, relaxation techniques, changing routine, distraction), if not completed on admission                                                           ( )  Basic information about quitting (benefits of quitting, techniques in how to quit, available resources, if not completed on admission  ( ) Referral for counseling faxed to [facility]   (x ) Patient refused referral  ( ) Patient refused counseling  ( ) Patient refused smoking cessation medication upon discharge      Art Zamora RN

## 2019-07-01 NOTE — DISCHARGE SUMMARY
manager. HOSPITAL COURSE:  The patient was hospitalized for 20 days and during  that time he continued with Risperdal 2 mg twice a day and then switched  to Risperdal Consta 25 mg every 14 days. He was also on Loxitane 50 mg  twice a day at discharge. During his hospitalization, he showed a  relatively long period of time to improve. He remained very  disorganized during majority of his stay, and was focused on women _____  to reproduce at one point. He was showing a range of thoughts and a  difficulty connecting any topics. Risperdal was increased to 3 mg  b.i.d. He then talks about his two wives that he had. For the most  part, he was very real cooperative and relatively pleasant. He did not  seem to be responding very well to just Risperdal and that is when  Loxitane was added to his regimen 25 mg twice a day, increased to 50 mg  twice a day. Towards the end of his hospitalization, he did show some  improvement and really never attended any groups. He was rather  psychotic, but towards the end, he remained more active and was able to  attend a few program groups. He was discharged home with outpatient  services. There were two antipsychotics at this time with a goal of  reducing to one antipsychotic in the future through [facility].         Martin Wynn MD    D: 06/30/2019 21:25:23       T: 06/30/2019 23:03:52     [email or web address]  Job#: [id number]     Doc#: [id number]    CC:

## 2019-09-25 ENCOUNTER — HOSPITAL ENCOUNTER (EMERGENCY)
Age: 39
Discharge: HOME | End: 2019-09-26
Payer: MEDICARE

## 2019-09-25 VITALS
SYSTOLIC BLOOD PRESSURE: 166 MMHG | TEMPERATURE: 97.8 F | DIASTOLIC BLOOD PRESSURE: 115 MMHG | HEART RATE: 84 BPM | OXYGEN SATURATION: 93 % | BODY MASS INDEX: 34.21 KG/M2 | RESPIRATION RATE: 20 BRPM | HEIGHT: 67 IN | WEIGHT: 218 LBS

## 2019-09-25 DIAGNOSIS — Z79.899: Primary | ICD-10-CM

## 2019-09-25 DIAGNOSIS — Z86.59 H/O SCHIZOPHRENIA: ICD-10-CM

## 2019-09-25 PROCEDURE — 99283 EMERGENCY DEPT VISIT LOW MDM: CPT

## 2019-09-25 RX ORDER — CARVEDILOL 6.25 MG/1
TABLET ORAL
COMMUNITY
Start: 2019-09-10

## 2019-09-25 RX ORDER — CARIPRAZINE 1.5 MG/1
CAPSULE, GELATIN COATED ORAL
COMMUNITY
Start: 2019-09-25

## 2020-02-28 ENCOUNTER — HOSPITAL ENCOUNTER (EMERGENCY)
Age: 40
Discharge: HOME | End: 2020-02-28
Payer: MEDICARE

## 2020-02-28 VITALS
BODY MASS INDEX: 35.29 KG/M2 | TEMPERATURE: 96.9 F | WEIGHT: 225.31 LBS | OXYGEN SATURATION: 94 % | RESPIRATION RATE: 16 BRPM | DIASTOLIC BLOOD PRESSURE: 105 MMHG | SYSTOLIC BLOOD PRESSURE: 139 MMHG | HEART RATE: 94 BPM

## 2020-02-28 PROCEDURE — 99283 EMERGENCY DEPT VISIT LOW MDM: CPT

## 2020-02-28 NOTE — ED NOTES
Bed: S48  Expected date:   Expected time:   Means of arrival:   Comments:  Hold [facility]  Joanna Gaming RN  02/28/20 3461

## 2020-02-29 NOTE — ED PROVIDER NOTES
[address]      Pt Name: Cori Soria  MRN: [id number]  [place] 1980  Date of evaluation: 2/28/2020  Provider: LACIE Piedra    The ED Attending Physician was available for consultation but did not see or evaluate this patient. CHIEF COMPLAINT       Chief Complaint   Patient presents with    Depression     Patient is more depressed than usually. Patient states Nithin Diaz has no one to talk to. \"    Anxiety       HISTORY OF PRESENT ILLNESS  (Location/Symptom, Timing/Onset, Context/Setting, Quality, Duration, Modifying Factors, Severity.)   Cori Soira is a 44 y.o. male who presents to the emergency department with complaints of depression and loneliness. Patient has a history of schizoaffective disorder, is under psychiatric care, says he sees his psychiatrist regularly but his next appointment is not for a little more than a month from now. He says he is taking all of his prescribed medications, as prescribed. Reports that he lives alone in an apartment, and he often gets especially depressed and lonely around this time of year. He says he does not really have anyone to talk to. He reports that he has discussed this with his , and he was to have an appointment earlier today to discuss social opportunities but that was rescheduled for about a week from now. He denies any prior history of suicidal attempts, and says he occasionally wonders if people would be better off without him but he denies any suicidal intentions or specific ideation. Denies any intention to harm others. Says has been eating regularly. He reports to me that he occasionally has episodes where he feels like the colors he sees are out of control, like they are taking over, as when he is looking at something and he suddenly feels trapped.   He says this happened intensely this evening, and that is why he called for an ambulance and wanted to come to the hospital.  He says  Cancer Paternal Aunt     Heart Disease Paternal Grandfather     Cancer Paternal Grandfather     Substance Abuse Mother      Family Status   Relation Name Status    PAunt  (Not Specified)    PGF  (Not Specified)    Mother  (Not Specified)        SOCIAL HISTORY      reports that he has been smoking cigarettes. He has a 46.00 pack-year smoking history. He has never used smokeless tobacco. He reports current alcohol use. He reports previous drug use. PHYSICAL EXAM    (up to 7 for level 4, 8 or more for level 5)     ED Triage Vitals [02/28/20 1806]   BP Temp Temp Source Pulse Resp SpO2 Height Weight   (!) 139/105 96.9 °F (36.1 °C) Oral 94 16 94 % -- 225 lb 5 oz (102.2 kg)       Constitutional:  Appearing well-developed and well-nourished. No distress. HENT:  Normocephalic and atraumatic. Cardiovascular:  Normal rate, regular rhythm, normal heart sounds and intact distal pulses. Pulmonary/Chest:  Effort normal and breath sounds normal. No respiratory distress. Musculoskeletal:  Normal range of motion. No edema exhibited. Neurological:  Oriented to person, place, and time. No cranial nerve deficit. Skin:  Skin is warm and dry. Not diaphoretic. Psychiatric: Flat affect. Normal mood and behavior. Occasional wandering attention and erratic or disconnected thought content. DIAGNOSTIC RESULTS     RADIOLOGY:   Non-plain film images such as CT, Ultrasound and MRI are read by the radiologist. Plain radiographic images are visualized and preliminarily interpreted by LACIE Mcdonald with the below findings:    None. Interpretation per the Radiologist below, if available at the time of this note:    No orders to display       LABS:  Labs Reviewed - No data to display    All other labs were within normal range or not returned as of this dictation.     EMERGENCY DEPARTMENT COURSE and DIFFERENTIAL DIAGNOSIS/MDM:   Vitals:    Vitals:    02/28/20 1806   BP: (!) 139/105   Pulse: 94   Resp: 16   Temp: 96.9 °F (36.1 °C)   TempSrc: Oral   SpO2: 94%   Weight: 225 lb 5 oz (102.2 kg)       The patient's condition in the ED was good, the patient was afebrile and nontoxic in appearance, and the patient's physical exam was unremarkable. Patient clearly denied any thoughts of hurting himself or others or any intention of suicide. He did exhibit some signs consistent with schizophrenic disorder, but was not acutely psychotic, was not combative or agitated, and appeared only mildly depressed. He reports that he has been taking his medications as prescribed. There was no indication for hospitalization or further workup. I encouraged the patient to seek out any possible social opportunities, contact his  or psychiatrist whenever he needs to, and maintain regular physical activity and exercise. Patient was discharged. The patient verbalized understanding and agreement with this plan of care. The patient was advised to return to the emergency department if symptoms should significantly worsen or if new and concerning symptoms should appear. I estimate there is LOW risk for SUICIDAL BEHAVIOR, HOMICIDAL BEHAVIOR, PSYCHOSIS, DANGEROUS OR VIOLENT BEHAVIOR, DISORIENTATION, OR MENTAL HEALTH CONDITION REQUIRING HOSPITALIZATION, thus I consider the discharge disposition reasonable. PROCEDURES:  None    FINAL IMPRESSION      1. Depressed mood with feeling of loneliness    2.  History of schizophrenia          DISPOSITION/PLAN   DISPOSITION Decision To Discharge 02/28/2020 08:00:52 PM      PATIENT REFERRED TO:  your psychiatrist or     Call   As needed, for follow-up care      DISCHARGE MEDICATIONS:  Discharge Medication List as of 2/28/2020  8:06 PM          (Please note that portions of this note were completed with a voice recognition program.  Efforts were made to edit the dictations but occasionally words are mis-transcribed.)    Heriberto Bell, [address] [facility], [facility] Adiel Gregory  02/28/20 7104

## 2021-01-20 NOTE — BH NOTE
[de-identified] : Discussed findings of today's exam and possible causes of patient's pain.  Educated patient on their most probable diagnosis of 1 year of chronic intermittent right wrist pain, no recent injury but having acute exacerbation and more frequent pain.  Reviewed possible courses of treatment, and we collaboratively decided best course of treatment at this time will include conservative management.  Patient has no evidence of fracture on outpatient x-rays which were reviewed today.  He has no degenerative changes seen.  Patient has no specific muscular weakness, and this increases likelihood of chronic ligamentous sprain or possible ligament tear.  At this time recommend MRI of the right wrist to evaluate for the integrity of the scapholunate ligament, or other possible ligament partial or complete tear that may be the etiology of his chronic pain.  Patient will follow-up once MRI results are available.  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  Patient appreciates and agrees with current plan.\par \par This note was generated using dragon medical dictation software.  A reasonable effort has been made for proofreading its contents, but typos may still remain.  If there are any questions or points of clarification needed please notify my office. Limits         Patient Currently in Pain: No  Daily Nutrition (WDL): Exceptions to WDL  Barriers to Nutrition: Cognitive impairment  Level of Assistance: Independent/Self    Patient Monitoring:  Frequency of Checks: 4 times per hour, close    Psychiatric Symptoms:   Depression Symptoms  Depression Symptoms: Impaired concentration, Isolative, Feelings of helplessness  Anxiety Symptoms  Anxiety Symptoms: Generalized  Irma Symptoms  Irma Symptoms: Poor judgment, Flight of ideas, Pressured speech     Psychosis Symptoms  Delusion Type: No problems reported or observed.     Suicide Risk CSSR-S:  1) Within the past month, have you wished you were dead or wished you could go to sleep and not wake up? : (WENDI)  2) Have you actually had any thoughts of killing yourself? : (WENDI)  6) Have you ever done anything, started to do anything, or prepared to do anything to end your life?: (WENDI)  Change in Result  Change in Plan of care NO       EDUCATION:   Learner Progress Toward Treatment Goals: Reviewed results and recommendations of this team    Method: Individual    Outcome: No evidence of Learning    PATIENT GOALS: unable to report a goal     PLAN/TREATMENT RECOMMENDATIONS UPDATE:maintain safety, medication management     GOALS UPDATE:   Time frame for Short-Term Goals: by time of discharge       Celeste Roth RN

## 2024-06-18 NOTE — GROUP NOTE
Detail Level: Zone Group Therapy Note    Date: May 23    Group Start Time: 1105  Group End Time: 1150  Group Topic: Psychotherapy    [facility]    Saniya Rodríguez, [facility]        Group Therapy Note    Attendees: 7         Patient's Goal:  Pt will improve interpersonal interactions through group processing and agenda setting.      Notes:  Pt participated in group discussion, provided supportive feedback, and addressed his agenda within the group. Status After Intervention:  Improved    Participation Level:  Active Listener and Interactive    Participation Quality: Appropriate, Attentive and Supportive      Speech:  normal      Thought Process/Content: Linear      Affective Functioning: Flat      Mood: dysphoric      Level of consciousness:  Alert and Attentive      Response to Learning: Able to verbalize current knowledge/experience and Progressing to goal      Endings: None Reported    Modes of Intervention: Education, Support, Socialization, Exploration, Clarifying, Problem-solving, Activity and Movement      Discipline Responsible: /Counselor      Signature:  Saniya Rodríguez, [facility] Topical Sulfur Applications Pregnancy And Lactation Text: This medication is Pregnancy Category C and has an unknown safety profile during pregnancy. It is unknown if this topical medication is excreted in breast milk. Dapsone Pregnancy And Lactation Text: This medication is Pregnancy Category C and is not considered safe during pregnancy or breast feeding. Azithromycin Counseling:  I discussed with the patient the risks of azithromycin including but not limited to GI upset, allergic reaction, drug rash, diarrhea, and yeast infections. Benzoyl Peroxide Counseling: Patient counseled that medicine may cause skin irritation and bleach clothing.  In the event of skin irritation, the patient was advised to reduce the amount of the drug applied or use it less frequently.   The patient verbalized understanding of the proper use and possible adverse effects of benzoyl peroxide.  All of the patient's questions and concerns were addressed. Spironolactone Pregnancy And Lactation Text: This medication can cause feminization of the male fetus and should be avoided during pregnancy. The active metabolite is also found in breast milk. Topical Clindamycin Pregnancy And Lactation Text: This medication is Pregnancy Category B and is considered safe during pregnancy. It is unknown if it is excreted in breast milk. High Dose Vitamin A Counseling: Side effects reviewed, pt to contact office should one occur. Topical Retinoid counseling:  Patient advised to apply a pea-sized amount only at bedtime and wait 30 minutes after washing their face before applying.  If too drying, patient may add a non-comedogenic moisturizer. The patient verbalized understanding of the proper use and possible adverse effects of retinoids.  All of the patient's questions and concerns were addressed. Tetracycline Pregnancy And Lactation Text: This medication is Pregnancy Category D and not consider safe during pregnancy. It is also excreted in breast milk. Minocycline Counseling: Patient advised regarding possible photosensitivity and discoloration of the teeth, skin, lips, tongue and gums.  Patient instructed to avoid sunlight, if possible.  When exposed to sunlight, patients should wear protective clothing, sunglasses, and sunscreen.  The patient was instructed to call the office immediately if the following severe adverse effects occur:  hearing changes, easy bruising/bleeding, severe headache, or vision changes.  The patient verbalized understanding of the proper use and possible adverse effects of minocycline.  All of the patient's questions and concerns were addressed. Doxycycline Pregnancy And Lactation Text: This medication is Pregnancy Category D and not consider safe during pregnancy. It is also excreted in breast milk but is considered safe for shorter treatment courses. Bactrim Counseling:  I discussed with the patient the risks of sulfa antibiotics including but not limited to GI upset, allergic reaction, drug rash, diarrhea, dizziness, photosensitivity, and yeast infections.  Rarely, more serious reactions can occur including but not limited to aplastic anemia, agranulocytosis, methemoglobinemia, blood dyscrasias, liver or kidney failure, lung infiltrates or desquamative/blistering drug rashes. Azelaic Acid Counseling: Patient counseled that medicine may cause skin irritation and to avoid applying near the eyes.  In the event of skin irritation, the patient was advised to reduce the amount of the drug applied or use it less frequently.   The patient verbalized understanding of the proper use and possible adverse effects of azelaic acid.  All of the patient's questions and concerns were addressed. Tazorac Pregnancy And Lactation Text: This medication is not safe during pregnancy. It is unknown if this medication is excreted in breast milk. Bactrim Pregnancy And Lactation Text: This medication is Pregnancy Category D and is known to cause fetal risk.  It is also excreted in breast milk. Erythromycin Counseling:  I discussed with the patient the risks of erythromycin including but not limited to GI upset, allergic reaction, drug rash, diarrhea, increase in liver enzymes, and yeast infections. Aklief counseling:  Patient advised to apply a pea-sized amount only at bedtime and wait 30 minutes after washing their face before applying.  If too drying, patient may add a non-comedogenic moisturizer.  The most commonly reported side effects including irritation, redness, scaling, dryness, stinging, burning, itching, and increased risk of sunburn.  The patient verbalized understanding of the proper use and possible adverse effects of retinoids.  All of the patient's questions and concerns were addressed. Isotretinoin Counseling: Patient should get monthly blood tests, not donate blood, not drive at night if vision affected, not share medication, and not undergo elective surgery for 6 months after tx completed. Side effects reviewed, pt to contact office should one occur. Include Pregnancy/Lactation Warning?: No Birth Control Pills Pregnancy And Lactation Text: This medication should be avoided if pregnant and for the first 30 days post-partum. Topical Sulfur Applications Counseling: Topical Sulfur Counseling: Patient counseled that this medication may cause skin irritation or allergic reactions.  In the event of skin irritation, the patient was advised to reduce the amount of the drug applied or use it less frequently.   The patient verbalized understanding of the proper use and possible adverse effects of topical sulfur application.  All of the patient's questions and concerns were addressed. Tetracycline Counseling: Patient counseled regarding possible photosensitivity and increased risk for sunburn.  Patient instructed to avoid sunlight, if possible.  When exposed to sunlight, patients should wear protective clothing, sunglasses, and sunscreen.  The patient was instructed to call the office immediately if the following severe adverse effects occur:  hearing changes, easy bruising/bleeding, severe headache, or vision changes.  The patient verbalized understanding of the proper use and possible adverse effects of tetracycline.  All of the patient's questions and concerns were addressed. Patient understands to avoid pregnancy while on therapy due to potential birth defects. Benzoyl Peroxide Pregnancy And Lactation Text: This medication is Pregnancy Category C. It is unknown if benzoyl peroxide is excreted in breast milk. High Dose Vitamin A Pregnancy And Lactation Text: High dose vitamin A therapy is contraindicated during pregnancy and breast feeding. Doxycycline Counseling:  Patient counseled regarding possible photosensitivity and increased risk for sunburn.  Patient instructed to avoid sunlight, if possible.  When exposed to sunlight, patients should wear protective clothing, sunglasses, and sunscreen.  The patient was instructed to call the office immediately if the following severe adverse effects occur:  hearing changes, easy bruising/bleeding, severe headache, or vision changes.  The patient verbalized understanding of the proper use and possible adverse effects of doxycycline.  All of the patient's questions and concerns were addressed. Dapsone Counseling: I discussed with the patient the risks of dapsone including but not limited to hemolytic anemia, agranulocytosis, rashes, methemoglobinemia, kidney failure, peripheral neuropathy, headaches, GI upset, and liver toxicity.  Patients who start dapsone require monitoring including baseline LFTs and weekly CBCs for the first month, then every month thereafter.  The patient verbalized understanding of the proper use and possible adverse effects of dapsone.  All of the patient's questions and concerns were addressed. Isotretinoin Pregnancy And Lactation Text: This medication is Pregnancy Category X and is considered extremely dangerous during pregnancy. It is unknown if it is excreted in breast milk. Azithromycin Pregnancy And Lactation Text: This medication is considered safe during pregnancy and is also secreted in breast milk. Topical Retinoid Pregnancy And Lactation Text: This medication is Pregnancy Category C. It is unknown if this medication is excreted in breast milk. Winlevi Counseling:  I discussed with the patient the risks of topical clascoterone including but not limited to erythema, scaling, itching, and stinging. Patient voiced their understanding. Erythromycin Pregnancy And Lactation Text: This medication is Pregnancy Category B and is considered safe during pregnancy. It is also excreted in breast milk. Sarecycline Counseling: Patient advised regarding possible photosensitivity and discoloration of the teeth, skin, lips, tongue and gums.  Patient instructed to avoid sunlight, if possible.  When exposed to sunlight, patients should wear protective clothing, sunglasses, and sunscreen.  The patient was instructed to call the office immediately if the following severe adverse effects occur:  hearing changes, easy bruising/bleeding, severe headache, or vision changes.  The patient verbalized understanding of the proper use and possible adverse effects of sarecycline.  All of the patient's questions and concerns were addressed. Birth Control Pills Counseling: Birth Control Pill Counseling: I discussed with the patient the potential side effects of OCPs including but not limited to increased risk of stroke, heart attack, thrombophlebitis, deep venous thrombosis, hepatic adenomas, breast changes, GI upset, headaches, and depression.  The patient verbalized understanding of the proper use and possible adverse effects of OCPs. All of the patient's questions and concerns were addressed. Tazorac Counseling:  Patient advised that medication is irritating and drying.  Patient may need to apply sparingly and wash off after an hour before eventually leaving it on overnight.  The patient verbalized understanding of the proper use and possible adverse effects of tazorac.  All of the patient's questions and concerns were addressed. Winlevi Pregnancy And Lactation Text: This medication is considered safe during pregnancy and breastfeeding. Aklief Pregnancy And Lactation Text: It is unknown if this medication is safe to use during pregnancy.  It is unknown if this medication is excreted in breast milk.  Breastfeeding women should use the topical cream on the smallest area of the skin for the shortest time needed while breastfeeding.  Do not apply to nipple and areola. Topical Clindamycin Counseling: Patient counseled that this medication may cause skin irritation or allergic reactions.  In the event of skin irritation, the patient was advised to reduce the amount of the drug applied or use it less frequently.   The patient verbalized understanding of the proper use and possible adverse effects of clindamycin.  All of the patient's questions and concerns were addressed. Spironolactone Counseling: Patient advised regarding risks of diarrhea, abdominal pain, hyperkalemia, birth defects (for female patients), liver toxicity and renal toxicity. The patient may need blood work to monitor liver and kidney function and potassium levels while on therapy. The patient verbalized understanding of the proper use and possible adverse effects of spironolactone.  All of the patient's questions and concerns were addressed. Azelaic Acid Pregnancy And Lactation Text: This medication is considered safe during pregnancy and breast feeding. Detail Level: Simple

## 2025-02-05 ENCOUNTER — OFFICE VISIT (OUTPATIENT)
Dept: PULMONOLOGY | Age: 45
End: 2025-02-05
Payer: MEDICARE

## 2025-02-05 VITALS
WEIGHT: 240.4 LBS | HEIGHT: 67 IN | BODY MASS INDEX: 37.73 KG/M2 | SYSTOLIC BLOOD PRESSURE: 114 MMHG | OXYGEN SATURATION: 97 % | HEART RATE: 68 BPM | DIASTOLIC BLOOD PRESSURE: 70 MMHG

## 2025-02-05 DIAGNOSIS — R06.89 GASPING FOR BREATH: ICD-10-CM

## 2025-02-05 DIAGNOSIS — I10 HYPERTENSION, UNSPECIFIED TYPE: ICD-10-CM

## 2025-02-05 DIAGNOSIS — G47.10 HYPERSOMNIA: ICD-10-CM

## 2025-02-05 DIAGNOSIS — E66.01 SEVERE OBESITY (BMI 35.0-39.9) WITH COMORBIDITY: ICD-10-CM

## 2025-02-05 DIAGNOSIS — R06.83 SNORING: Primary | ICD-10-CM

## 2025-02-05 PROCEDURE — 4004F PT TOBACCO SCREEN RCVD TLK: CPT | Performed by: STUDENT IN AN ORGANIZED HEALTH CARE EDUCATION/TRAINING PROGRAM

## 2025-02-05 PROCEDURE — G8427 DOCREV CUR MEDS BY ELIG CLIN: HCPCS | Performed by: STUDENT IN AN ORGANIZED HEALTH CARE EDUCATION/TRAINING PROGRAM

## 2025-02-05 PROCEDURE — G8417 CALC BMI ABV UP PARAM F/U: HCPCS | Performed by: STUDENT IN AN ORGANIZED HEALTH CARE EDUCATION/TRAINING PROGRAM

## 2025-02-05 PROCEDURE — 3078F DIAST BP <80 MM HG: CPT | Performed by: STUDENT IN AN ORGANIZED HEALTH CARE EDUCATION/TRAINING PROGRAM

## 2025-02-05 PROCEDURE — 99204 OFFICE O/P NEW MOD 45 MIN: CPT | Performed by: STUDENT IN AN ORGANIZED HEALTH CARE EDUCATION/TRAINING PROGRAM

## 2025-02-05 PROCEDURE — 3074F SYST BP LT 130 MM HG: CPT | Performed by: STUDENT IN AN ORGANIZED HEALTH CARE EDUCATION/TRAINING PROGRAM

## 2025-02-05 RX ORDER — OLANZAPINE 10 MG/1
10 TABLET ORAL NIGHTLY
COMMUNITY
Start: 2025-01-30

## 2025-02-05 RX ORDER — GLIPIZIDE 5 MG/1
5 TABLET ORAL DAILY
COMMUNITY
Start: 2025-01-30

## 2025-02-05 RX ORDER — ATORVASTATIN CALCIUM 40 MG/1
40 TABLET, FILM COATED ORAL DAILY
COMMUNITY
Start: 2025-01-30

## 2025-02-05 RX ORDER — DIVALPROEX SODIUM 500 MG/1
500 TABLET, FILM COATED, EXTENDED RELEASE ORAL NIGHTLY
COMMUNITY
Start: 2025-01-30

## 2025-02-05 ASSESSMENT — SLEEP AND FATIGUE QUESTIONNAIRES
HOW LIKELY ARE YOU TO NOD OFF OR FALL ASLEEP WHILE SITTING AND READING: MODERATE CHANCE OF DOZING
HOW LIKELY ARE YOU TO NOD OFF OR FALL ASLEEP WHILE SITTING QUIETLY AFTER LUNCH WITHOUT ALCOHOL: WOULD NEVER DOZE
HOW LIKELY ARE YOU TO NOD OFF OR FALL ASLEEP WHILE SITTING INACTIVE IN A PUBLIC PLACE: SLIGHT CHANCE OF DOZING
ESS TOTAL SCORE: 8
HOW LIKELY ARE YOU TO NOD OFF OR FALL ASLEEP WHILE SITTING AND TALKING TO SOMEONE: WOULD NEVER DOZE
HOW LIKELY ARE YOU TO NOD OFF OR FALL ASLEEP WHILE WATCHING TV: MODERATE CHANCE OF DOZING
HOW LIKELY ARE YOU TO NOD OFF OR FALL ASLEEP WHEN YOU ARE A PASSENGER IN A CAR FOR AN HOUR WITHOUT A BREAK: MODERATE CHANCE OF DOZING
HOW LIKELY ARE YOU TO NOD OFF OR FALL ASLEEP IN A CAR, WHILE STOPPED FOR A FEW MINUTES IN TRAFFIC: WOULD NEVER DOZE
HOW LIKELY ARE YOU TO NOD OFF OR FALL ASLEEP WHILE LYING DOWN TO REST IN THE AFTERNOON WHEN CIRCUMSTANCES PERMIT: SLIGHT CHANCE OF DOZING

## 2025-02-05 NOTE — PATIENT INSTRUCTIONS
Patient information on the evaluation procedure for sleep apnea:    1. You are going to have a portable sleep study:  This is a home sleep study that will be done to see if you have obstructive sleep apnea. You will have a flow monitor on your nose, belt on your chest and a oxygen/heart rate monitor on your finger. Please do not wear nail polish on day of the study as it will interfere with the oxygen reading probe that is placed on your finger during the study.   Once you receive the device, you will also receive instructions on how to put it on and turn it on.  After 2 nights you will return it.    2. The sleep office will call you with the results a week after the study.     If the study showed that you have obstructive sleep apnea you will be told of the next step in your care. Commonly the recommended treatment is CPAP Therapy. If you agree to this therapy and you receive your CPAP before your next appointment, please bring it with you to your first follow-up appointment.      Patients who have obstructive sleep apnea on the sleep study and have chosen to try CPAP:  A home equipment company will contact you to set you up with a CPAP machine and fit you for a mask.    Please bring your CPAP, the mask and all supplies including the electrical cord with you to all your first follow up appointments with the sleep physician    Please keep trying to use your CPAP until you have seen in the sleep office in follow up as a lot of the problems patients have with CPAP can be addressed and corrected by your sleep provider.    Sleep center contact information:  [facility]: [phone]  [facility]: [phone]             What are the risk factors for Obstructive Sleep Apnea (NICO)?  Obesity  Snoring  Daytime sleepiness  Increasing age  Male gender  Taking sedating medications  Alcohol use  Hypertension  Stroke  Diabetes  Smoking     What is NICO?  People with NICO experience recurrent

## 2025-02-05 NOTE — PROGRESS NOTES
[facility]  Sleep Medicine  [facility], [address]  [place] [address]    Chief Complaint   Patient presents with    New Patient    Fatigue       Isidoro Greenwood is a 44 y.o. male who comes in for sleep evaluation.  His complaints include daytime fatigue, gasping for air at night, and non refreshed sleep. Onset of symptoms was 1 year ago.     Pertinent PMHx includes: GERD, hypertension, DM2, HLD, schizophrenia, severe obesity.    He goes to bed at 8pm. He falls asleep in  few minutes.  He awakens several times per night. He awakens at around 4-5am. The average total amount of sleep is about 12 hours per night. He does not use sleep aid/s but takes sedating antipsychotics. He does take daytime naps. He describes the symptoms as fatigue, snoring, waking up in the middle of the night gasping for air, non refreshed sleep , falling asleep/dosing off during idle situations, and morning dry mouth. He does not have symptoms consistent / suggestive of restless legs syndrome. He has not dozed off while driving. He denies recent significant weight gain (it has been steady). Previous evaluation and treatment has included: none.    Family hx of sleep disorders: not to patient's knowledge  Caffeinated drinks/day: several cups of coffee  Alcohol intake/day/week: a few drinks per month  Occupation: unemployed       DATA REVIEWED TODAY:  [place] & Insomnia Severity Index scores      2/5/2025     9:54 AM   Sleep Medicine   Sitting and reading 2   Watching TV 2   Sitting, inactive in a public place (e.g. a theatre or a meeting) 1   As a passenger in a car for an hour without a break 2   Lying down to rest in the afternoon when circumstances permit 1   Sitting and talking to someone 0   Sitting quietly after a lunch without alcohol 0   In a car, while stopped for a few minutes in traffic 0   [place] Sleepiness Score 8   Neck (Inches) 18.5         2/5/2025     9:00 AM   Insomnia Severity Index (COV)   Difficulty

## 2025-02-12 ENCOUNTER — HOSPITAL ENCOUNTER (OUTPATIENT)
Dept: SLEEP CENTER | Age: 45
Discharge: HOME | End: 2025-02-12
Payer: MEDICARE

## 2025-02-12 DIAGNOSIS — R06.89 GASPING FOR BREATH: ICD-10-CM

## 2025-02-12 DIAGNOSIS — E66.01 SEVERE OBESITY (BMI 35.0-39.9) WITH COMORBIDITY: ICD-10-CM

## 2025-02-12 DIAGNOSIS — R06.83 SNORING: ICD-10-CM

## 2025-02-12 DIAGNOSIS — I10 HYPERTENSION, UNSPECIFIED TYPE: ICD-10-CM

## 2025-02-12 DIAGNOSIS — G47.10 HYPERSOMNIA: ICD-10-CM

## 2025-02-12 PROCEDURE — 95806 SLEEP STUDY UNATT&RESP EFFT: CPT

## 2025-02-13 ENCOUNTER — TELEPHONE (OUTPATIENT)
Dept: PULMONOLOGY | Age: 45
End: 2025-02-13

## 2025-02-13 NOTE — TELEPHONE ENCOUNTER
Please inform patient that his sleep study showed severe sleep apnea and that he stopped breathing or his breathing was inadequate about 35 times for every hour of sleep. His blood oxygen also dropped as low as 79% during the night.     If he agrees I will order a CPAP via a durable medical equipment company of their choice (if no preference, we will go with [employer], based on location) and they will reach out to keep him updated on the process. This will be the company that is going to work with him insurance and provide the CPAP device, along with replacing the mask and other supplies going forward. If they have any questions, they can let you know or message me via [employer] ([place]). If patient agrees with plan, please route the PAP order to DME company (order already completed on a separate order encounter). Patient should be scheduled for 31 to 90 days follow up.    Thanks  Linden Martin MD

## 2025-02-13 NOTE — PROGRESS NOTES
Ordering Provider:   Linden Martin MD - Diplomate, [facility] Sleep Medicine  [address] [place] [address], [place] [address]    Phone [phone]  Fax [phone]    Diagnosis: [x] NICO  (G47.33) [] CSA (G47.31) []  Other:__________________   Length of Need: [] 13 months [x]  99 Months                                          []  Other:__________________   Machine (KASSANDRA): [] Respironics Auto (with modem for remote monitoring)       [] Other:____________________    [x]  ResMed Auto (with modem for remote monitoring)    [x]  CPAP () [] Bilevel ()   Mode: Mode:   [x] Auto [] Fixed [] Auto [] Spontaneous   Pmin:_____8____cmH2O      Pmax:_____18____cmH2O   P:_________cmH2O    EPAPmin:__________cmH2O IPAP:__________cmH2O     IPAPmax:__________cmH2O EPAP:__________cmH2O     PSmin:_______  PSmax:_______       (ResMed) PS:_________     Flex/EPR - 3 full time                          Ramp time: 30 min Flex/EPR - 3 full time                 Ramp time: 30 min   Ramp Pressure:_____4______cmH2O Ramp Pressure:____________cmH2O         Humidifier: [x] Heated ()                                               [] Passive     [x] Water chamber replacement ()/ 1 per 6 months   Mask:   [x] Nasal () /1 per 3 months [] Full Face () /1 per 3 months   [x] Patient choice -Size and fit mask [] Patient Choice - Size and fit mask   [x] Dispense: nasal cushion  [] Dispense:    [] Dispense:    [x] Headgear () / 1 per 3 months [] Headgear () / 1 per 3 months   [x] Replacement Nasal Cushion ()/2 per month [] Interface Replacement ()/1 per month   [] Replacement Nasal Pillows ()/2 per month       Tubing: [x] Heated ()/1 per 3 months                           [] Standard ()/1 per 3 months  [] Other:____________________   Filters: [x] Non-disposable ()/1 per 6 months                 [x] Ultra-Fine, Disposable ()/2 per month     Miscellaneous: [x]

## 2025-02-14 NOTE — TELEPHONE ENCOUNTER
I attempted to reach patient by phone but unsuccessfully. I left a vm asking patient to call us back and let us know what's the best time to reach them.    Or I will try to reach out for more time.    Linden Martin MD

## 2025-02-14 NOTE — TELEPHONE ENCOUNTER
Pt advised. Pt sounded aggravated and didn't seem too interested in results. I asked if he would agree to start CPAP, he said no. I asked if I could schedule him a visit to discuss further with the doctor, he said no.  I asked if he had any questions I could answer and he hung up.

## 2025-03-03 ENCOUNTER — TELEPHONE (OUTPATIENT)
Dept: PULMONOLOGY | Age: 45
End: 2025-03-03

## 2025-03-03 NOTE — TELEPHONE ENCOUNTER
Pt called in and stated [facility] left him a message that they are shipping out cpap. Pt states he does not want to start cpap and when he calls [facility] they hang up on him. I sent a message to Sonali @ [facility] [place] [facility] advising pt does not want cpap shipped.